# Patient Record
Sex: FEMALE | Race: WHITE | Employment: FULL TIME | URBAN - NONMETROPOLITAN AREA
[De-identification: names, ages, dates, MRNs, and addresses within clinical notes are randomized per-mention and may not be internally consistent; named-entity substitution may affect disease eponyms.]

---

## 2020-03-14 NOTE — PROGRESS NOTES
surgical history, allergies, medications, social and family history and I have made updates where appropriate. Review of Systems  Positive responses are highlighted in bold    Constitutional:  Fever, Chills, Night Sweats, Fatigue, Unexpected changes in weight  Eyes:  Eye discharge, Eye pain, Eye redness, Visual disturbances   HENT:  Ear pain, Tinnitus, Nosebleeds, Trouble swallowing, Hearing loss, Sore throat  Cardiovascular:  Chest Pain, Palpitations, Orthopnea, Paroxysmal Nocturnal Dyspnea  Respiratory:  Cough, Wheezing, Shortness of breath, Chest tightness, Apnea  Gastrointestinal:  Nausea, Vomiting, Diarrhea, Constipation, Heartburn, Blood in stool  Genitourinary:  Difficulty or painful urination, Flank pain, Change in frequency, Urgency  Skin:  Color change, Rash, Itching, Wound  Psychiatric:  Hallucinations, Anxiety, Depression, Suicidal ideation  Hematological:  Enlarged glands, Easy bleeding, Easily bruising  Musculoskeletal:  Joint pain, Back pain, Gait problems, Joint swelling, Myalgias  Neurological:  Dizziness, Headaches, Presyncope, Numbness, Seizures, Tremors  Allergy:  Environmental allergies, Food allergies  Endocrine:  Heat Intolerance, Cold Intolerance, Polydipsia, Polyphagia, Polyuria      PHYSICAL EXAM:  Vitals:    03/18/20 1330   BP: 127/63   Pulse: 74   Resp: 18   Temp: 98.7 °F (37.1 °C)   Weight: 101 lb 12.8 oz (46.2 kg)   Height: 5' 4\" (1.626 m)     Body mass index is 17.47 kg/m².   Pain: 2 (back/joints)    VS Reviewed  General Appearance: well developed and well- nourished, in no acute distress  Head: normocephalic and atraumatic  Eyes: pupils equal, round, and reactive to light, conjunctivae and eye lids without erythema  ENT: external ear and ear canal normal bilaterally, nose without deformity, nasal mucosa and turbinates normal without polyps, oropharynx normal, dentition is normal for age, no lip or gum lesions noted  Neck: supple and non-tender without mass, no thyromegaly or 0-800 /cmm        Abs Eos Count                   0                         0-500 /cmm        Abs Baso Count                  0                         0-200 /cmm      RBC Morphology        Anisocytosis                    2+        Exam Date: 03/06/20  Accession #:  [id number]  Exam:  CT   CT Head Without Contrast [id number]  Result: See Report    **     ADDENDUM:    **  STUDY:  CT BRAIN WITHOUT CONTRAST   REASON FOR EXAM:   Female, 77years old. Altered mental status. History   of advanced lung cancer. TECHNIQUE:   Transaxial CT imaging of the brain was performed without   administration of intravenous contrast material.   Individualized dose optimization techniques were used for this CT.   COMPARISON:   2/26/2015, and MRI 11/14/2019.   ___________________________________   FINDINGS:     Normal soft tissue structures. Normal calvarium. Since previous exam including the recent MRI, patient has developed   several intracranial masses consistent with metastatic disease. There is a mildly hyperdense mass with probable central necrosis of the   right temporal lobe, measuring 2.3 cm greatest dimension and associated   with prominent surrounding vasogenic edema. There is effacement of   overlying sulci. There are bilateral relatively symmetric masses of the occipital lobes,   3.7 cm greatest dimension on the right, and 3.6 cm greatest dimension on   the left. Both have probable central necrosis. Both have prominent   surrounding vasogenic edema. Both have effacement of overlying sulci. There is otherwise mild atrophy and diffuse white matter disease. No   midline shift or evidence for transtentorial herniation. Normal basal ganglia and thalami. Normal brainstem. Normal cerebellum. There is no intracranial hemorrhage. There are no findings of an acute   ischemic infarction. Normal visualized paranasal sinuses.    ___________________________________     IMPRESSION:   At least 3 moderately large probable metastases since previous exams. STUDY:  CT BRAIN WITHOUT CONTRAST   REASON FOR EXAM:   Female, 77years old. Altered mental status. History   of advanced lung cancer. TECHNIQUE:   Transaxial CT imaging of the brain was performed without   administration of intravenous contrast material.   Individualized dose optimization techniques were used for this CT.   COMPARISON:   2/26/2015, and MRI 11/14/2019.   ___________________________________   FINDINGS:     Normal soft tissue structures. Normal calvarium. Since previous exam including the recent MRI, patient has developed   several intracranial masses consistent with metastatic disease. There is a mildly hyperdense mass with probable central necrosis of the   right temporal lobe, measuring 2.3 cm greatest dimension and associated   with prominent surrounding vasogenic edema. There is effacement of   overlying sulci. There are bilateral relatively symmetric masses of the occipital lobes,   3.7 cm greatest dimension on the right, and 3.6 cm greatest dimension on   the left. Both have probable central necrosis. Both have prominent   surrounding vasogenic edema. Both have effacement of overlying sulci. There is otherwise mild atrophy and diffuse white matter disease. No   midline shift or evidence for transtentorial herniation. Normal basal ganglia and thalami. Normal brainstem. Normal cerebellum. There is no intracranial hemorrhage. There are no findings of an acute   ischemic infarction. Normal visualized paranasal sinuses. ___________________________________     IMPRESSION:   At least 3 moderately large probable metastases since previous exams. N.B. : The above information has been verbally conveyed by Ria Vizcarra MD to  FAISAL Arnold, on 03/06/2020 16:15:06 (ET).      Electronically Signed:   Kendra Valenzuela MD   2020/03/06 at 16:07 EST   Tel [phone], Service support [phone], Fax [phone]          Addendum Dictated by:  Dharmesh Rooney MD on 03/06/20 at [phone]  Transcribed by:  Dharmesh Rooney on 03/06/20 at [phone]    Report Signed by:  Dharmesh Rooney MD on 03/06/20 5221      **We are attempting to reach an attending provider to discuss findings. An   addendum with communication details will be sent when the communication is   complete. **     STUDY:  CT BRAIN WITHOUT CONTRAST   REASON FOR EXAM:   Female, 77years old. Altered mental status. History   of advanced lung cancer. TECHNIQUE:   Transaxial CT imaging of the brain was performed without   administration of intravenous contrast material.   Individualized dose optimization techniques were used for this CT.   COMPARISON:   2/26/2015, and MRI 11/14/2019.   ___________________________________   FINDINGS:     Normal soft tissue structures. Normal calvarium. Since previous exam including the recent MRI, patient has developed   several intracranial masses consistent with metastatic disease. There is a mildly hyperdense mass with probable central necrosis of the   right temporal lobe, measuring 2.3 cm greatest dimension and associated   with prominent surrounding vasogenic edema. There is effacement of   overlying sulci. There are bilateral relatively symmetric masses of the occipital lobes,   3.7 cm greatest dimension on the right, and 3.6 cm greatest dimension on   the left. Both have probable central necrosis. Both have prominent   surrounding vasogenic edema. Both have effacement of overlying sulci. There is otherwise mild atrophy and diffuse white matter disease. No   midline shift or evidence for transtentorial herniation. Normal basal ganglia and thalami. Normal brainstem. Normal cerebellum. There is no intracranial hemorrhage. There are no findings of an acute   ischemic infarction. Normal visualized paranasal sinuses.    ___________________________________ IMPRESSION:   At least 3 moderately large probable metastases since previous exams. Electronically Signed:   Mor Callejas MD   2020/03/06 at 16:07 EST   Tel [phone], Service support  [phone], Fax [phone]      ASSESSMENT & PLAN  1. Non-small cell lung cancer, unspecified laterality [facility]    Stage IV with mets to the brain  Currently full code  I don't think she fully understands what's going on, but it's hard to say  Will con't with onc plan for chemo/radiation  con't decadron her oncology    Pt adamant about going home  However, family feel she's not safe or ready. I and therapy agree  She will stay here until safe. She could leave AMA, but her family will not allow that. 2. Lung cancer metastatic to brain [facility])    As per # 1    3. Acute encephalopathy    Does appear to be improving  con't decadron and oncology f/u    4. Normocytic anemia    Stable  Monitor labs  Likely d/t cancer, chemo etc    5. Hyponatremia    Better on f/u labs  Monitor. 6. Chronic obstructive pulmonary disease, unspecified COPD type ([facility]    Stable  con't O2, prn alb    7. Paroxysmal atrial fibrillation ([facility])    Stable  con't eliquis, lopressor  lasix  Monitor     8. Hypertension, essential    At goal  con't norvasc, lopressor    9. History of CVA (cerebrovascular accident)    con't tertiary prevention, eliquis    10. Physical deconditioning    con't PT/OT      Disposition: FULL CODE. Con't PT/OT. Reassess 30 days, sooner prn.        Electronically signed by Yumi Mayo DO on 3/18/2020 at 1:48 PM

## 2020-03-18 ENCOUNTER — OUTSIDE SERVICES (OUTPATIENT)
Dept: FAMILY MEDICINE CLINIC | Age: 65
End: 2020-03-18
Payer: COMMERCIAL

## 2020-03-18 VITALS
TEMPERATURE: 98.7 F | HEART RATE: 74 BPM | DIASTOLIC BLOOD PRESSURE: 63 MMHG | WEIGHT: 101.8 LBS | RESPIRATION RATE: 18 BRPM | SYSTOLIC BLOOD PRESSURE: 127 MMHG | BODY MASS INDEX: 17.38 KG/M2 | HEIGHT: 64 IN

## 2020-03-18 PROCEDURE — 99305 1ST NF CARE MODERATE MDM 35: CPT | Performed by: FAMILY MEDICINE

## 2020-04-03 ENCOUNTER — VIRTUAL VISIT (OUTPATIENT)
Dept: FAMILY MEDICINE CLINIC | Age: 65
End: 2020-04-03
Payer: COMMERCIAL

## 2020-04-03 VITALS
TEMPERATURE: 98.4 F | SYSTOLIC BLOOD PRESSURE: 106 MMHG | WEIGHT: 102.2 LBS | HEIGHT: 64 IN | DIASTOLIC BLOOD PRESSURE: 64 MMHG | RESPIRATION RATE: 16 BRPM | HEART RATE: 82 BPM | BODY MASS INDEX: 17.45 KG/M2

## 2020-04-03 PROCEDURE — 99310 SBSQ NF CARE HIGH MDM 45: CPT | Performed by: FAMILY MEDICINE

## 2020-04-03 NOTE — PROGRESS NOTES
K92.2 - Gastrointestinal hemorrhage, unspecified  Home Going Treatment Plan:  Status post 2 PRBC transfusion. -H&H is stable. Patient is vehemently refusing EGD/colonoscopy & procedure has been  canceled. -Continue to hold Eliquis and patient is seen by her oncologist outpatient. H&H remained  stable. -Continue PT/OT and pending disposition, follow-up with /case management. (2) Atrial fibrillation  Status: Acute  Qualifiers:     Qualified Code(s): I48.91 - Unspecified atrial fibrillation  Home Going Treatment Plan:  continue to hold Eliquis for now. Continue beta-blocker. (3) Lung cancer metastatic to brain  Status: Acute  Home Going Treatment Plan:  Updated patient's oncologist Dr. Jake Miller. Follow-up oncology outpatient. (4) Malnourished  Status: Acute  Qualifiers:     Malnutrition type: protein-calorie malnutrition   Protein-calorie malnutrition  severity: moderate   Qualified Code(s): E44.0 - Moderate protein-calorie malnutrition  Home Going Treatment Plan:  Nutritional support. [facility] Course  Patient received 2 PRBC transfusion. Eliquis was placed on hold. Patient was evaluated  by GI and recommended endoscopy. Patient vehemently refused endoscopic testing. H&H  improved & remained stable. Eliquis will continue to be placed for on hold due to  bleeding risk until patient is seen by hematology/oncologist to decide when to resume. Patient remained stable and will be discharged to SNF. Kristie Leblanc \"    Since readmission:  Doing well  No s/s GI bleeding  Denies abd pain  No melena or hematochezia  Labs stable today. Was placed on seroquel during admission, it's not immediately clear why this was done  Pt denies any agitation  Does have some anxiety  No sI/hI      -LAST VISIT:  Patient admitted to [facility] from 3/6 to 3/13 for AMS. Known stage IV NSCLC with brain metastases.    D/c summary:  [facility] Course  78 yo Patient with pmh non small cell lung CA, COPD currently smoking 2 packs a pain, Tinnitus, Nosebleeds, Trouble swallowing, Hearing loss, Sore throat  Cardiovascular:  Chest Pain, Palpitations, Orthopnea, Paroxysmal Nocturnal Dyspnea  Respiratory:  Cough, Wheezing, Shortness of breath, Chest tightness, Apnea  Gastrointestinal:  Nausea, Vomiting, Diarrhea, Constipation, Heartburn, Blood in stool  Genitourinary:  Difficulty or painful urination, Flank pain, Change in frequency, Urgency  Skin:  Color change, Rash, Itching, Wound  Psychiatric:  Hallucinations, Anxiety, Depression, Suicidal ideation  Hematological:  Enlarged glands, Easy bleeding, Easily bruising  Musculoskeletal:  Joint pain, Back pain, Gait problems, Joint swelling, Myalgias  Neurological:  Dizziness, Headaches, Presyncope, Numbness, Seizures, Tremors  Allergy:  Environmental allergies, Food allergies  Endocrine:  Heat Intolerance, Cold Intolerance, Polydipsia, Polyphagia, Polyuria      PHYSICAL EXAM:    Vitals:    04/03/20 1615   BP: 106/64   Pulse: 82   Resp: 16   Temp: 98.4 °F (36.9 °C)   Weight: 102 lb 3.2 oz (46.4 kg)   Height: 5' 4\" (1.626 m)     Pain Score: 3(Chronic back pain)  Body mass index is 17.54 kg/m². VS Reviewed  General Appearance: A&O x 3, No acute distress,well developed and well- nourished  Head: normocephalic and atraumatic  Eyes: pupils equal, round, and reactive to light, extraocular eye movements intact, conjunctivae and eye lids without erythema  ENT: External ears w/o redness, external nares without redness or discharge. Hearing is intact. Lips are w/o lesion. Teeth are in good repair. Neck: No deformity of the neck. No thyromegaly on visual inspection. Pulmonary/Chest: normal respiratory effort. Normal respiratory rate. No respiratory distress . Musculoskeletal: Gait: unsteady. Digits/Nails: with arthritic changes.  No obvious clubbing   Neuro Exam:   Cranial nerve III: Pupils: equal, round, reactive to light   Cranial nerves III, IV, VI: Extraocular Movements: intact   Cranial nerve

## 2020-04-12 ENCOUNTER — HOSPITAL ENCOUNTER (INPATIENT)
Age: 65
LOS: 3 days | Discharge: NURSING, REHAB OR LONG-TERM CARE | DRG: 871 | End: 2020-04-15
Attending: INTERNAL MEDICINE | Admitting: INTERNAL MEDICINE
Payer: COMMERCIAL

## 2020-04-12 ENCOUNTER — APPOINTMENT (OUTPATIENT)
Dept: CT IMAGING | Age: 65
DRG: 871 | End: 2020-04-12
Payer: COMMERCIAL

## 2020-04-12 PROBLEM — A41.9 SEPSIS (HCC): Status: ACTIVE | Noted: 2020-04-12

## 2020-04-12 LAB
ALBUMIN SERPL-MCNC: 2.6 G/DL (ref 3.5–5.1)
ALP BLD-CCNC: 122 U/L (ref 38–126)
ALT SERPL-CCNC: 20 U/L (ref 11–66)
ANION GAP SERPL CALCULATED.3IONS-SCNC: 14 MEQ/L (ref 8–16)
ANISOCYTOSIS: PRESENT
AST SERPL-CCNC: 10 U/L (ref 5–40)
BASOPHILIC STIPPLING: ABNORMAL
BASOPHILS # BLD: 0 %
BASOPHILS ABSOLUTE: 0 THOU/MM3 (ref 0–0.1)
BILIRUB SERPL-MCNC: < 0.2 MG/DL (ref 0.3–1.2)
BILIRUBIN DIRECT: < 0.2 MG/DL (ref 0–0.3)
BILIRUBIN URINE: NEGATIVE
BLOOD, URINE: NEGATIVE
BUN BLDV-MCNC: 46 MG/DL (ref 7–22)
C-REACTIVE PROTEIN: 3.28 MG/DL (ref 0–1)
CALCIUM SERPL-MCNC: 8.4 MG/DL (ref 8.5–10.5)
CHARACTER, URINE: CLEAR
CHLORIDE BLD-SCNC: 92 MEQ/L (ref 98–111)
CO2: 30 MEQ/L (ref 23–33)
COLOR: YELLOW
CREAT SERPL-MCNC: 1.2 MG/DL (ref 0.4–1.2)
DIFFERENTIAL, MANUAL: NORMAL
EKG ATRIAL RATE: 78 BPM
EKG P AXIS: 67 DEGREES
EKG P-R INTERVAL: 146 MS
EKG Q-T INTERVAL: 398 MS
EKG QRS DURATION: 88 MS
EKG QTC CALCULATION (BAZETT): 453 MS
EKG R AXIS: 75 DEGREES
EKG T AXIS: 107 DEGREES
EKG VENTRICULAR RATE: 78 BPM
EOSINOPHIL # BLD: 0 %
EOSINOPHILS ABSOLUTE: 0 THOU/MM3 (ref 0–0.4)
ERYTHROCYTE [DISTWIDTH] IN BLOOD BY AUTOMATED COUNT: 19 % (ref 11.5–14.5)
ERYTHROCYTE [DISTWIDTH] IN BLOOD BY AUTOMATED COUNT: 65.9 FL (ref 35–45)
FERRITIN: 332 NG/ML (ref 10–291)
FLU A ANTIGEN: NEGATIVE
FLU B ANTIGEN: NEGATIVE
GFR SERPL CREATININE-BSD FRML MDRD: 45 ML/MIN/1.73M2
GLUCOSE BLD-MCNC: 159 MG/DL (ref 70–108)
GLUCOSE URINE: NEGATIVE MG/DL
GROUP A STREP CULTURE, REFLEX: NEGATIVE
HCT VFR BLD CALC: 26.6 % (ref 37–47)
HEMOGLOBIN: 8.6 GM/DL (ref 12–16)
INR BLD: 0.91 (ref 0.85–1.13)
KETONES, URINE: NEGATIVE
LACTIC ACID, SEPSIS: 2.2 MMOL/L (ref 0.5–1.9)
LACTIC ACID, SEPSIS: 4.3 MMOL/L (ref 0.5–1.9)
LD: 202 U/L (ref 100–190)
LEUKOCYTE ESTERASE, URINE: NEGATIVE
LIPASE: 16.5 U/L (ref 5.6–51.3)
LYMPHOCYTES # BLD: 1 %
LYMPHOCYTES ABSOLUTE: 0.2 THOU/MM3 (ref 1–4.8)
MAGNESIUM: 1.8 MG/DL (ref 1.6–2.4)
MCH RBC QN AUTO: 30.6 PG (ref 26–33)
MCHC RBC AUTO-ENTMCNC: 32.3 GM/DL (ref 32.2–35.5)
MCV RBC AUTO: 94.7 FL (ref 81–99)
METAMYELOCYTES: 1 %
MONOCYTES # BLD: 1 %
MONOCYTES ABSOLUTE: 0.2 THOU/MM3 (ref 0.4–1.3)
NITRITE, URINE: NEGATIVE
NUCLEATED RED BLOOD CELLS: 0 /100 WBC
OSMOLALITY CALCULATION: 287.2 MOSMOL/KG (ref 275–300)
PH UA: 7 (ref 5–9)
PLATELET # BLD: 182 THOU/MM3 (ref 130–400)
PLATELET ESTIMATE: ADEQUATE
PMV BLD AUTO: 8.7 FL (ref 9.4–12.4)
POTASSIUM SERPL-SCNC: 4.1 MEQ/L (ref 3.5–5.2)
PROCALCITONIN: 0.46 NG/ML (ref 0.01–0.09)
PROTEIN UA: NEGATIVE
RBC # BLD: 2.81 MILL/MM3 (ref 4.2–5.4)
REFLEX THROAT C + S: NORMAL
SEG NEUTROPHILS: 97 %
SEGMENTED NEUTROPHILS ABSOLUTE COUNT: 21.6 THOU/MM3 (ref 1.8–7.7)
SODIUM BLD-SCNC: 136 MEQ/L (ref 135–145)
SPECIFIC GRAVITY, URINE: 1.02 (ref 1–1.03)
TOTAL PROTEIN: 5.2 G/DL (ref 6.1–8)
TOXIC GRANULATION: PRESENT
TROPONIN T: 0.02 NG/ML
UROBILINOGEN, URINE: 0.2 EU/DL (ref 0–1)
WBC # BLD: 22.3 THOU/MM3 (ref 4.8–10.8)

## 2020-04-12 PROCEDURE — 2580000003 HC RX 258: Performed by: PHYSICIAN ASSISTANT

## 2020-04-12 PROCEDURE — 86140 C-REACTIVE PROTEIN: CPT

## 2020-04-12 PROCEDURE — 87070 CULTURE OTHR SPECIMN AEROBIC: CPT

## 2020-04-12 PROCEDURE — 6370000000 HC RX 637 (ALT 250 FOR IP): Performed by: PHYSICIAN ASSISTANT

## 2020-04-12 PROCEDURE — 2580000003 HC RX 258: Performed by: NURSE PRACTITIONER

## 2020-04-12 PROCEDURE — 85610 PROTHROMBIN TIME: CPT

## 2020-04-12 PROCEDURE — 6360000004 HC RX CONTRAST MEDICATION: Performed by: PHYSICIAN ASSISTANT

## 2020-04-12 PROCEDURE — 96374 THER/PROPH/DIAG INJ IV PUSH: CPT

## 2020-04-12 PROCEDURE — 93005 ELECTROCARDIOGRAM TRACING: CPT | Performed by: PHYSICIAN ASSISTANT

## 2020-04-12 PROCEDURE — 85025 COMPLETE CBC W/AUTO DIFF WBC: CPT

## 2020-04-12 PROCEDURE — 84145 PROCALCITONIN (PCT): CPT

## 2020-04-12 PROCEDURE — 87804 INFLUENZA ASSAY W/OPTIC: CPT

## 2020-04-12 PROCEDURE — 87147 CULTURE TYPE IMMUNOLOGIC: CPT

## 2020-04-12 PROCEDURE — 6370000000 HC RX 637 (ALT 250 FOR IP): Performed by: NURSE PRACTITIONER

## 2020-04-12 PROCEDURE — 2060000000 HC ICU INTERMEDIATE R&B

## 2020-04-12 PROCEDURE — 71275 CT ANGIOGRAPHY CHEST: CPT

## 2020-04-12 PROCEDURE — 82728 ASSAY OF FERRITIN: CPT

## 2020-04-12 PROCEDURE — 99285 EMERGENCY DEPT VISIT HI MDM: CPT

## 2020-04-12 PROCEDURE — 82248 BILIRUBIN DIRECT: CPT

## 2020-04-12 PROCEDURE — U0002 COVID-19 LAB TEST NON-CDC: HCPCS

## 2020-04-12 PROCEDURE — 99223 1ST HOSP IP/OBS HIGH 75: CPT | Performed by: INTERNAL MEDICINE

## 2020-04-12 PROCEDURE — 87880 STREP A ASSAY W/OPTIC: CPT

## 2020-04-12 PROCEDURE — 83615 LACTATE (LD) (LDH) ENZYME: CPT

## 2020-04-12 PROCEDURE — 6360000002 HC RX W HCPCS: Performed by: PHYSICIAN ASSISTANT

## 2020-04-12 PROCEDURE — 81003 URINALYSIS AUTO W/O SCOPE: CPT

## 2020-04-12 PROCEDURE — 83690 ASSAY OF LIPASE: CPT

## 2020-04-12 PROCEDURE — 83605 ASSAY OF LACTIC ACID: CPT

## 2020-04-12 PROCEDURE — 80053 COMPREHEN METABOLIC PANEL: CPT

## 2020-04-12 PROCEDURE — 87040 BLOOD CULTURE FOR BACTERIA: CPT

## 2020-04-12 PROCEDURE — 83735 ASSAY OF MAGNESIUM: CPT

## 2020-04-12 PROCEDURE — 96361 HYDRATE IV INFUSION ADD-ON: CPT

## 2020-04-12 PROCEDURE — 36415 COLL VENOUS BLD VENIPUNCTURE: CPT

## 2020-04-12 PROCEDURE — 84484 ASSAY OF TROPONIN QUANT: CPT

## 2020-04-12 PROCEDURE — 2709999900 HC NON-CHARGEABLE SUPPLY

## 2020-04-12 PROCEDURE — 87081 CULTURE SCREEN ONLY: CPT

## 2020-04-12 RX ORDER — ONDANSETRON 2 MG/ML
4 INJECTION INTRAMUSCULAR; INTRAVENOUS ONCE
Status: COMPLETED | OUTPATIENT
Start: 2020-04-12 | End: 2020-04-12

## 2020-04-12 RX ORDER — LEVETIRACETAM 500 MG/1
500 TABLET ORAL 2 TIMES DAILY
COMMUNITY

## 2020-04-12 RX ORDER — LANOLIN ALCOHOL/MO/W.PET/CERES
3 CREAM (GRAM) TOPICAL DAILY
COMMUNITY

## 2020-04-12 RX ORDER — LANOLIN ALCOHOL/MO/W.PET/CERES
3 CREAM (GRAM) TOPICAL DAILY
Status: DISCONTINUED | OUTPATIENT
Start: 2020-04-13 | End: 2020-04-15 | Stop reason: HOSPADM

## 2020-04-12 RX ORDER — 0.9 % SODIUM CHLORIDE 0.9 %
30 INTRAVENOUS SOLUTION INTRAVENOUS ONCE
Status: COMPLETED | OUTPATIENT
Start: 2020-04-12 | End: 2020-04-12

## 2020-04-12 RX ORDER — SODIUM CHLORIDE 0.9 % (FLUSH) 0.9 %
10 SYRINGE (ML) INJECTION EVERY 12 HOURS SCHEDULED
Status: DISCONTINUED | OUTPATIENT
Start: 2020-04-12 | End: 2020-04-15 | Stop reason: HOSPADM

## 2020-04-12 RX ORDER — QUETIAPINE FUMARATE 25 MG/1
25 TABLET, FILM COATED ORAL 2 TIMES DAILY
Status: DISCONTINUED | OUTPATIENT
Start: 2020-04-13 | End: 2020-04-15 | Stop reason: HOSPADM

## 2020-04-12 RX ORDER — ACETAMINOPHEN 325 MG/1
650 TABLET ORAL EVERY 6 HOURS PRN
COMMUNITY

## 2020-04-12 RX ORDER — POLYETHYLENE GLYCOL 3350 17 G/17G
17 POWDER, FOR SOLUTION ORAL DAILY PRN
Status: DISCONTINUED | OUTPATIENT
Start: 2020-04-12 | End: 2020-04-15 | Stop reason: HOSPADM

## 2020-04-12 RX ORDER — SODIUM CHLORIDE 0.9 % (FLUSH) 0.9 %
10 SYRINGE (ML) INJECTION PRN
Status: DISCONTINUED | OUTPATIENT
Start: 2020-04-12 | End: 2020-04-15 | Stop reason: HOSPADM

## 2020-04-12 RX ORDER — QUETIAPINE FUMARATE 25 MG/1
25 TABLET, FILM COATED ORAL 2 TIMES DAILY
COMMUNITY

## 2020-04-12 RX ORDER — METOPROLOL SUCCINATE 50 MG/1
50 TABLET, EXTENDED RELEASE ORAL DAILY
Status: DISCONTINUED | OUTPATIENT
Start: 2020-04-13 | End: 2020-04-14

## 2020-04-12 RX ORDER — QUETIAPINE FUMARATE 50 MG/1
50 TABLET, EXTENDED RELEASE ORAL NIGHTLY
Status: DISCONTINUED | OUTPATIENT
Start: 2020-04-12 | End: 2020-04-15 | Stop reason: HOSPADM

## 2020-04-12 RX ORDER — ONDANSETRON HYDROCHLORIDE 8 MG/1
8 TABLET, FILM COATED ORAL EVERY 8 HOURS PRN
COMMUNITY

## 2020-04-12 RX ORDER — AMLODIPINE BESYLATE 10 MG/1
10 TABLET ORAL DAILY
COMMUNITY

## 2020-04-12 RX ORDER — ACETAMINOPHEN 325 MG/1
650 TABLET ORAL ONCE
Status: COMPLETED | OUTPATIENT
Start: 2020-04-12 | End: 2020-04-12

## 2020-04-12 RX ORDER — BUDESONIDE AND FORMOTEROL FUMARATE DIHYDRATE 160; 4.5 UG/1; UG/1
2 AEROSOL RESPIRATORY (INHALATION) 2 TIMES DAILY
Status: DISCONTINUED | OUTPATIENT
Start: 2020-04-12 | End: 2020-04-15 | Stop reason: HOSPADM

## 2020-04-12 RX ORDER — METOPROLOL SUCCINATE 50 MG/1
50 TABLET, EXTENDED RELEASE ORAL DAILY
Status: ON HOLD | COMMUNITY
End: 2020-04-15 | Stop reason: HOSPADM

## 2020-04-12 RX ORDER — QUETIAPINE FUMARATE 50 MG/1
50 TABLET, EXTENDED RELEASE ORAL NIGHTLY
COMMUNITY

## 2020-04-12 RX ORDER — ACETAMINOPHEN 650 MG/1
650 SUPPOSITORY RECTAL EVERY 6 HOURS PRN
Status: DISCONTINUED | OUTPATIENT
Start: 2020-04-12 | End: 2020-04-13

## 2020-04-12 RX ORDER — PANTOPRAZOLE SODIUM 20 MG/1
20 TABLET, DELAYED RELEASE ORAL DAILY
COMMUNITY

## 2020-04-12 RX ORDER — PANTOPRAZOLE SODIUM 40 MG/1
40 TABLET, DELAYED RELEASE ORAL DAILY
Status: DISCONTINUED | OUTPATIENT
Start: 2020-04-13 | End: 2020-04-14

## 2020-04-12 RX ORDER — ONDANSETRON 4 MG/1
8 TABLET, FILM COATED ORAL EVERY 8 HOURS PRN
Status: DISCONTINUED | OUTPATIENT
Start: 2020-04-12 | End: 2020-04-15 | Stop reason: HOSPADM

## 2020-04-12 RX ORDER — BUDESONIDE AND FORMOTEROL FUMARATE DIHYDRATE 160; 4.5 UG/1; UG/1
2 AEROSOL RESPIRATORY (INHALATION) 2 TIMES DAILY
COMMUNITY

## 2020-04-12 RX ORDER — DEXAMETHASONE 4 MG/1
4 TABLET ORAL 2 TIMES DAILY WITH MEALS
Status: DISCONTINUED | OUTPATIENT
Start: 2020-04-13 | End: 2020-04-15 | Stop reason: HOSPADM

## 2020-04-12 RX ORDER — POLYETHYLENE GLYCOL 3350 17 G/17G
17 POWDER, FOR SOLUTION ORAL DAILY PRN
COMMUNITY

## 2020-04-12 RX ORDER — FUROSEMIDE 40 MG/1
40 TABLET ORAL 2 TIMES DAILY
Status: DISCONTINUED | OUTPATIENT
Start: 2020-04-13 | End: 2020-04-13

## 2020-04-12 RX ORDER — AMLODIPINE BESYLATE 10 MG/1
10 TABLET ORAL DAILY
Status: DISCONTINUED | OUTPATIENT
Start: 2020-04-13 | End: 2020-04-15 | Stop reason: HOSPADM

## 2020-04-12 RX ORDER — 0.9 % SODIUM CHLORIDE 0.9 %
500 INTRAVENOUS SOLUTION INTRAVENOUS ONCE
Status: COMPLETED | OUTPATIENT
Start: 2020-04-12 | End: 2020-04-12

## 2020-04-12 RX ORDER — DEXAMETHASONE 4 MG/1
4 TABLET ORAL 2 TIMES DAILY WITH MEALS
Status: ON HOLD | COMMUNITY
End: 2020-04-15 | Stop reason: HOSPADM

## 2020-04-12 RX ORDER — SENNOSIDES 8.8 MG/5ML
5 LIQUID ORAL 2 TIMES DAILY PRN
Status: DISCONTINUED | OUTPATIENT
Start: 2020-04-12 | End: 2020-04-15 | Stop reason: HOSPADM

## 2020-04-12 RX ORDER — ACETAMINOPHEN 325 MG/1
650 TABLET ORAL EVERY 6 HOURS PRN
Status: DISCONTINUED | OUTPATIENT
Start: 2020-04-12 | End: 2020-04-13

## 2020-04-12 RX ORDER — PROMETHAZINE HYDROCHLORIDE 25 MG/1
12.5 TABLET ORAL EVERY 6 HOURS PRN
Status: DISCONTINUED | OUTPATIENT
Start: 2020-04-12 | End: 2020-04-15 | Stop reason: HOSPADM

## 2020-04-12 RX ORDER — PROCHLORPERAZINE MALEATE 10 MG
10 TABLET ORAL EVERY 6 HOURS PRN
Status: DISCONTINUED | OUTPATIENT
Start: 2020-04-12 | End: 2020-04-15 | Stop reason: HOSPADM

## 2020-04-12 RX ORDER — FUROSEMIDE 40 MG/1
40 TABLET ORAL 2 TIMES DAILY
Status: ON HOLD | COMMUNITY
End: 2020-04-15 | Stop reason: HOSPADM

## 2020-04-12 RX ORDER — 0.9 % SODIUM CHLORIDE 0.9 %
500 INTRAVENOUS SOLUTION INTRAVENOUS ONCE
Status: DISCONTINUED | OUTPATIENT
Start: 2020-04-12 | End: 2020-04-12

## 2020-04-12 RX ORDER — LEVETIRACETAM 500 MG/1
500 TABLET ORAL 2 TIMES DAILY
Status: DISCONTINUED | OUTPATIENT
Start: 2020-04-12 | End: 2020-04-15 | Stop reason: HOSPADM

## 2020-04-12 RX ORDER — PROCHLORPERAZINE MALEATE 10 MG
10 TABLET ORAL EVERY 6 HOURS PRN
COMMUNITY

## 2020-04-12 RX ORDER — ONDANSETRON 2 MG/ML
4 INJECTION INTRAMUSCULAR; INTRAVENOUS EVERY 6 HOURS PRN
Status: DISCONTINUED | OUTPATIENT
Start: 2020-04-12 | End: 2020-04-15 | Stop reason: HOSPADM

## 2020-04-12 RX ADMIN — PIPERACILLIN AND TAZOBACTAM 3.38 G: 3; .375 INJECTION, POWDER, FOR SOLUTION INTRAVENOUS at 19:53

## 2020-04-12 RX ADMIN — ONDANSETRON 4 MG: 2 INJECTION INTRAMUSCULAR; INTRAVENOUS at 18:28

## 2020-04-12 RX ADMIN — SODIUM CHLORIDE 500 ML: 9 INJECTION, SOLUTION INTRAVENOUS at 16:29

## 2020-04-12 RX ADMIN — ACETAMINOPHEN 650 MG: 325 TABLET ORAL at 16:13

## 2020-04-12 RX ADMIN — VANCOMYCIN HYDROCHLORIDE 750 MG: 1 INJECTION, POWDER, LYOPHILIZED, FOR SOLUTION INTRAVENOUS at 20:32

## 2020-04-12 RX ADMIN — SODIUM CHLORIDE 1470 ML: 9 INJECTION, SOLUTION INTRAVENOUS at 21:41

## 2020-04-12 RX ADMIN — LEVETIRACETAM 500 MG: 500 TABLET, FILM COATED ORAL at 23:49

## 2020-04-12 RX ADMIN — Medication 10 ML: at 23:52

## 2020-04-12 RX ADMIN — ACETAMINOPHEN 650 MG: 325 TABLET ORAL at 23:49

## 2020-04-12 RX ADMIN — IOPAMIDOL 80 ML: 755 INJECTION, SOLUTION INTRAVENOUS at 18:56

## 2020-04-12 RX ADMIN — QUETIAPINE FUMARATE 50 MG: 50 TABLET, EXTENDED RELEASE ORAL at 23:49

## 2020-04-12 ASSESSMENT — ENCOUNTER SYMPTOMS
COLOR CHANGE: 0
VOMITING: 1
DIARRHEA: 0
SORE THROAT: 0
NAUSEA: 1
COUGH: 0
SINUS PRESSURE: 0
SHORTNESS OF BREATH: 0
ABDOMINAL PAIN: 0
SINUS PAIN: 0
WHEEZING: 0
BACK PAIN: 0
RHINORRHEA: 0
CONSTIPATION: 0

## 2020-04-12 ASSESSMENT — PAIN SCALES - GENERAL
PAINLEVEL_OUTOF10: 4
PAINLEVEL_OUTOF10: 3

## 2020-04-12 ASSESSMENT — PAIN DESCRIPTION - PROGRESSION: CLINICAL_PROGRESSION: GRADUALLY WORSENING

## 2020-04-12 ASSESSMENT — PAIN DESCRIPTION - LOCATION
LOCATION: HEAD

## 2020-04-12 ASSESSMENT — PAIN DESCRIPTION - DIRECTION: RADIATING_TOWARDS: NO

## 2020-04-12 ASSESSMENT — PAIN DESCRIPTION - PAIN TYPE
TYPE: ACUTE PAIN

## 2020-04-12 ASSESSMENT — PAIN DESCRIPTION - ORIENTATION: ORIENTATION: ANTERIOR

## 2020-04-12 ASSESSMENT — PAIN DESCRIPTION - FREQUENCY: FREQUENCY: CONTINUOUS

## 2020-04-12 ASSESSMENT — PAIN DESCRIPTION - ONSET: ONSET: ON-GOING

## 2020-04-12 ASSESSMENT — PAIN DESCRIPTION - DESCRIPTORS: DESCRIPTORS: HEADACHE

## 2020-04-12 ASSESSMENT — PAIN - FUNCTIONAL ASSESSMENT: PAIN_FUNCTIONAL_ASSESSMENT: ACTIVITIES ARE NOT PREVENTED

## 2020-04-12 NOTE — ED NOTES
Bed: 038A  Expected date:   Expected time:   Means of arrival: [facility]  Comments:     Sharona Dunbar RN  04/12/20 9359

## 2020-04-12 NOTE — ED PROVIDER NOTES
her pulse is 74. Her respiration is 18 and oxygen saturation is 100%. Physical Exam  Vitals signs and nursing note reviewed. Constitutional:       General: She is not in acute distress. Appearance: Normal appearance. She is well-developed. She is not ill-appearing, toxic-appearing or diaphoretic. HENT:      Head: Normocephalic and atraumatic. Right Ear: External ear normal.      Left Ear: External ear normal.      Nose: Nose normal.      Mouth/Throat:      Mouth: Mucous membranes are moist.      Pharynx: Oropharynx is clear. Eyes:      General: No scleral icterus. Right eye: No discharge. Left eye: No discharge. Conjunctiva/sclera: Conjunctivae normal.      Pupils: Pupils are equal, round, and reactive to light. Neck:      Musculoskeletal: Normal range of motion. Cardiovascular:      Rate and Rhythm: Normal rate and regular rhythm. Heart sounds: Normal heart sounds. No murmur. No friction rub. No gallop. Pulmonary:      Effort: Pulmonary effort is normal. No respiratory distress. Breath sounds: Normal breath sounds. No stridor. No wheezing, rhonchi or rales. Chest:      Chest wall: No tenderness. Abdominal:      General: There is no distension. Palpations: Abdomen is soft. Musculoskeletal: Normal range of motion. Skin:     General: Skin is warm and dry. Coloration: Skin is not pale. Findings: No erythema or rash. Neurological:      Mental Status: She is alert and oriented to person, place, and time. Mental status is at baseline. GCS: GCS eye subscore is 4. GCS verbal subscore is 5. GCS motor subscore is 6. Motor: No abnormal muscle tone. Coordination: Coordination normal.   Psychiatric:         Behavior: Behavior normal.         Thought Content:  Thought content normal.               DIFFERENTIAL DIAGNOSIS:   Includes but not limited to: viral URI, influenza, bronchitis, pneumonia, pulmonary mass    DIAGNOSTIC RESULTS

## 2020-04-12 NOTE — ED NOTES
Patient provided crackers and apple sauce at this time. Patient updated on pending CTA. Patient states she does not know were mediport was placed and if it is a power port or not. [facility] contacted, states patient normally is seen at [facility]. Medical records requested from [facility].      Minnie Jane RN  04/12/20 4110

## 2020-04-13 PROBLEM — E43 SEVERE MALNUTRITION (HCC): Status: ACTIVE | Noted: 2020-04-13

## 2020-04-13 LAB
ALBUMIN SERPL-MCNC: 2.3 G/DL (ref 3.5–5.1)
ALP BLD-CCNC: 106 U/L (ref 38–126)
ALT SERPL-CCNC: 16 U/L (ref 11–66)
ANION GAP SERPL CALCULATED.3IONS-SCNC: 13 MEQ/L (ref 8–16)
ANISOCYTOSIS: PRESENT
AST SERPL-CCNC: 9 U/L (ref 5–40)
BASOPHILS # BLD: 0.2 %
BASOPHILS ABSOLUTE: 0 THOU/MM3 (ref 0–0.1)
BILIRUB SERPL-MCNC: < 0.2 MG/DL (ref 0.3–1.2)
BILIRUBIN DIRECT: < 0.2 MG/DL (ref 0–0.3)
BUN BLDV-MCNC: 30 MG/DL (ref 7–22)
C-REACTIVE PROTEIN: 3.62 MG/DL (ref 0–1)
CALCIUM SERPL-MCNC: 7.7 MG/DL (ref 8.5–10.5)
CHLORIDE BLD-SCNC: 101 MEQ/L (ref 98–111)
CK MB: 1.7 NG/ML
CO2: 27 MEQ/L (ref 23–33)
CREAT SERPL-MCNC: 0.8 MG/DL (ref 0.4–1.2)
EOSINOPHIL # BLD: 0.2 %
EOSINOPHILS ABSOLUTE: 0 THOU/MM3 (ref 0–0.4)
ERYTHROCYTE [DISTWIDTH] IN BLOOD BY AUTOMATED COUNT: 19.1 % (ref 11.5–14.5)
ERYTHROCYTE [DISTWIDTH] IN BLOOD BY AUTOMATED COUNT: 67.6 FL (ref 35–45)
FERRITIN: 325 NG/ML (ref 10–291)
GFR SERPL CREATININE-BSD FRML MDRD: 72 ML/MIN/1.73M2
GLUCOSE BLD-MCNC: 79 MG/DL (ref 70–108)
HCT VFR BLD CALC: 23.4 % (ref 37–47)
HEMOGLOBIN: 7.5 GM/DL (ref 12–16)
IMMATURE GRANS (ABS): 1.09 THOU/MM3 (ref 0–0.07)
IMMATURE GRANULOCYTES: 6.7 %
INR BLD: 1.03 (ref 0.85–1.13)
LACTIC ACID, SEPSIS: 1.4 MMOL/L (ref 0.5–1.9)
LD: 205 U/L (ref 100–190)
LYMPHOCYTES # BLD: 3.2 %
LYMPHOCYTES ABSOLUTE: 0.5 THOU/MM3 (ref 1–4.8)
MAGNESIUM: 1.6 MG/DL (ref 1.6–2.4)
MCH RBC QN AUTO: 30.9 PG (ref 26–33)
MCHC RBC AUTO-ENTMCNC: 32.1 GM/DL (ref 32.2–35.5)
MCV RBC AUTO: 96.3 FL (ref 81–99)
MONOCYTES # BLD: 2 %
MONOCYTES ABSOLUTE: 0.3 THOU/MM3 (ref 0.4–1.3)
NUCLEATED RED BLOOD CELLS: 0 /100 WBC
PLATELET # BLD: 145 THOU/MM3 (ref 130–400)
PMV BLD AUTO: 8.5 FL (ref 9.4–12.4)
POTASSIUM SERPL-SCNC: 3.8 MEQ/L (ref 3.5–5.2)
POTASSIUM SERPL-SCNC: 4 MEQ/L (ref 3.5–5.2)
PROCALCITONIN: 0.39 NG/ML (ref 0.01–0.09)
RBC # BLD: 2.43 MILL/MM3 (ref 4.2–5.4)
SEG NEUTROPHILS: 87.7 %
SEGMENTED NEUTROPHILS ABSOLUTE COUNT: 14.4 THOU/MM3 (ref 1.8–7.7)
SODIUM BLD-SCNC: 141 MEQ/L (ref 135–145)
TOTAL CK: 10 U/L (ref 30–135)
TOTAL PROTEIN: 4.3 G/DL (ref 6.1–8)
WBC # BLD: 16.4 THOU/MM3 (ref 4.8–10.8)

## 2020-04-13 PROCEDURE — 6370000000 HC RX 637 (ALT 250 FOR IP): Performed by: NURSE PRACTITIONER

## 2020-04-13 PROCEDURE — 6360000002 HC RX W HCPCS: Performed by: PHYSICIAN ASSISTANT

## 2020-04-13 PROCEDURE — 84145 PROCALCITONIN (PCT): CPT

## 2020-04-13 PROCEDURE — 85610 PROTHROMBIN TIME: CPT

## 2020-04-13 PROCEDURE — 86140 C-REACTIVE PROTEIN: CPT

## 2020-04-13 PROCEDURE — 6360000002 HC RX W HCPCS: Performed by: NURSE PRACTITIONER

## 2020-04-13 PROCEDURE — 83615 LACTATE (LD) (LDH) ENZYME: CPT

## 2020-04-13 PROCEDURE — 93010 ELECTROCARDIOGRAM REPORT: CPT | Performed by: INTERNAL MEDICINE

## 2020-04-13 PROCEDURE — 82728 ASSAY OF FERRITIN: CPT

## 2020-04-13 PROCEDURE — 80053 COMPREHEN METABOLIC PANEL: CPT

## 2020-04-13 PROCEDURE — 83605 ASSAY OF LACTIC ACID: CPT

## 2020-04-13 PROCEDURE — 2580000003 HC RX 258: Performed by: NURSE PRACTITIONER

## 2020-04-13 PROCEDURE — 82553 CREATINE MB FRACTION: CPT

## 2020-04-13 PROCEDURE — 2060000000 HC ICU INTERMEDIATE R&B

## 2020-04-13 PROCEDURE — 84132 ASSAY OF SERUM POTASSIUM: CPT

## 2020-04-13 PROCEDURE — 83735 ASSAY OF MAGNESIUM: CPT

## 2020-04-13 PROCEDURE — 2580000003 HC RX 258: Performed by: PHYSICIAN ASSISTANT

## 2020-04-13 PROCEDURE — 82248 BILIRUBIN DIRECT: CPT

## 2020-04-13 PROCEDURE — 85025 COMPLETE CBC W/AUTO DIFF WBC: CPT

## 2020-04-13 PROCEDURE — 36415 COLL VENOUS BLD VENIPUNCTURE: CPT

## 2020-04-13 PROCEDURE — 99223 1ST HOSP IP/OBS HIGH 75: CPT | Performed by: NURSE PRACTITIONER

## 2020-04-13 PROCEDURE — 82550 ASSAY OF CK (CPK): CPT

## 2020-04-13 PROCEDURE — 6370000000 HC RX 637 (ALT 250 FOR IP): Performed by: PHYSICIAN ASSISTANT

## 2020-04-13 PROCEDURE — 99233 SBSQ HOSP IP/OBS HIGH 50: CPT | Performed by: INTERNAL MEDICINE

## 2020-04-13 PROCEDURE — 2709999900 HC NON-CHARGEABLE SUPPLY

## 2020-04-13 RX ORDER — SODIUM CHLORIDE 9 MG/ML
INJECTION, SOLUTION INTRAVENOUS CONTINUOUS
Status: DISCONTINUED | OUTPATIENT
Start: 2020-04-13 | End: 2020-04-14

## 2020-04-13 RX ORDER — MAGNESIUM SULFATE IN WATER 40 MG/ML
2 INJECTION, SOLUTION INTRAVENOUS ONCE
Status: COMPLETED | OUTPATIENT
Start: 2020-04-13 | End: 2020-04-13

## 2020-04-13 RX ORDER — FUROSEMIDE 40 MG/1
40 TABLET ORAL 2 TIMES DAILY
Status: DISCONTINUED | OUTPATIENT
Start: 2020-04-14 | End: 2020-04-13

## 2020-04-13 RX ORDER — POTASSIUM CHLORIDE 20 MEQ/1
40 TABLET, EXTENDED RELEASE ORAL PRN
Status: DISCONTINUED | OUTPATIENT
Start: 2020-04-13 | End: 2020-04-15 | Stop reason: HOSPADM

## 2020-04-13 RX ORDER — ACETAMINOPHEN 325 MG/1
650 TABLET ORAL EVERY 4 HOURS PRN
Status: DISCONTINUED | OUTPATIENT
Start: 2020-04-13 | End: 2020-04-15 | Stop reason: HOSPADM

## 2020-04-13 RX ORDER — ACETAMINOPHEN 650 MG/1
650 SUPPOSITORY RECTAL EVERY 4 HOURS PRN
Status: DISCONTINUED | OUTPATIENT
Start: 2020-04-13 | End: 2020-04-15 | Stop reason: HOSPADM

## 2020-04-13 RX ORDER — CYCLOBENZAPRINE HCL 10 MG
10 TABLET ORAL 3 TIMES DAILY PRN
Status: DISCONTINUED | OUTPATIENT
Start: 2020-04-13 | End: 2020-04-15 | Stop reason: HOSPADM

## 2020-04-13 RX ORDER — POTASSIUM CHLORIDE 7.45 MG/ML
10 INJECTION INTRAVENOUS PRN
Status: DISCONTINUED | OUTPATIENT
Start: 2020-04-13 | End: 2020-04-15 | Stop reason: HOSPADM

## 2020-04-13 RX ADMIN — LEVETIRACETAM 500 MG: 500 TABLET, FILM COATED ORAL at 09:56

## 2020-04-13 RX ADMIN — ACETAMINOPHEN 650 MG: 325 TABLET ORAL at 04:33

## 2020-04-13 RX ADMIN — PANTOPRAZOLE SODIUM 40 MG: 40 TABLET, DELAYED RELEASE ORAL at 09:55

## 2020-04-13 RX ADMIN — MAGNESIUM SULFATE HEPTAHYDRATE 2 G: 40 INJECTION, SOLUTION INTRAVENOUS at 20:21

## 2020-04-13 RX ADMIN — SERTRALINE 50 MG: 50 TABLET, FILM COATED ORAL at 09:56

## 2020-04-13 RX ADMIN — Medication 2 PUFF: at 09:56

## 2020-04-13 RX ADMIN — PIPERACILLIN AND TAZOBACTAM 3.38 G: 3; .375 INJECTION, POWDER, FOR SOLUTION INTRAVENOUS at 13:12

## 2020-04-13 RX ADMIN — CYCLOBENZAPRINE 10 MG: 10 TABLET, FILM COATED ORAL at 04:33

## 2020-04-13 RX ADMIN — Medication 10 ML: at 21:14

## 2020-04-13 RX ADMIN — PIPERACILLIN AND TAZOBACTAM 3.38 G: 3; .375 INJECTION, POWDER, FOR SOLUTION INTRAVENOUS at 04:09

## 2020-04-13 RX ADMIN — DEXAMETHASONE 4 MG: 4 TABLET ORAL at 09:56

## 2020-04-13 RX ADMIN — LEVETIRACETAM 500 MG: 500 TABLET, FILM COATED ORAL at 21:13

## 2020-04-13 RX ADMIN — Medication 2 PUFF: at 21:16

## 2020-04-13 RX ADMIN — APIXABAN 2.5 MG: 2.5 TABLET, FILM COATED ORAL at 21:13

## 2020-04-13 RX ADMIN — QUETIAPINE FUMARATE 25 MG: 25 TABLET ORAL at 09:56

## 2020-04-13 RX ADMIN — VANCOMYCIN HYDROCHLORIDE 750 MG: 1 INJECTION, POWDER, LYOPHILIZED, FOR SOLUTION INTRAVENOUS at 21:56

## 2020-04-13 RX ADMIN — SODIUM CHLORIDE: 9 INJECTION, SOLUTION INTRAVENOUS at 01:38

## 2020-04-13 RX ADMIN — Medication 3 MG: at 09:56

## 2020-04-13 RX ADMIN — APIXABAN 2.5 MG: 2.5 TABLET, FILM COATED ORAL at 09:56

## 2020-04-13 RX ADMIN — Medication 2 PUFF: at 01:38

## 2020-04-13 RX ADMIN — PIPERACILLIN AND TAZOBACTAM 3.38 G: 3; .375 INJECTION, POWDER, FOR SOLUTION INTRAVENOUS at 20:24

## 2020-04-13 RX ADMIN — AMLODIPINE BESYLATE 10 MG: 10 TABLET ORAL at 09:56

## 2020-04-13 RX ADMIN — METOPROLOL SUCCINATE 50 MG: 50 TABLET, EXTENDED RELEASE ORAL at 09:56

## 2020-04-13 RX ADMIN — SODIUM CHLORIDE: 9 INJECTION, SOLUTION INTRAVENOUS at 15:18

## 2020-04-13 RX ADMIN — QUETIAPINE FUMARATE 25 MG: 25 TABLET ORAL at 21:13

## 2020-04-13 RX ADMIN — QUETIAPINE FUMARATE 50 MG: 50 TABLET, EXTENDED RELEASE ORAL at 21:13

## 2020-04-13 RX ADMIN — DEXAMETHASONE 4 MG: 4 TABLET ORAL at 17:51

## 2020-04-13 ASSESSMENT — PAIN DESCRIPTION - ORIENTATION: ORIENTATION: ANTERIOR

## 2020-04-13 ASSESSMENT — PAIN DESCRIPTION - FREQUENCY: FREQUENCY: CONTINUOUS

## 2020-04-13 ASSESSMENT — PAIN SCALES - GENERAL
PAINLEVEL_OUTOF10: 3
PAINLEVEL_OUTOF10: 0
PAINLEVEL_OUTOF10: 3
PAINLEVEL_OUTOF10: 0
PAINLEVEL_OUTOF10: 0

## 2020-04-13 ASSESSMENT — PAIN DESCRIPTION - PAIN TYPE: TYPE: ACUTE PAIN

## 2020-04-13 ASSESSMENT — PAIN DESCRIPTION - ONSET: ONSET: ON-GOING

## 2020-04-13 ASSESSMENT — PAIN DESCRIPTION - PROGRESSION: CLINICAL_PROGRESSION: NOT CHANGED

## 2020-04-13 ASSESSMENT — PAIN DESCRIPTION - DIRECTION: RADIATING_TOWARDS: NO

## 2020-04-13 ASSESSMENT — PAIN DESCRIPTION - DESCRIPTORS: DESCRIPTORS: HEADACHE

## 2020-04-13 ASSESSMENT — PAIN DESCRIPTION - LOCATION: LOCATION: HEAD

## 2020-04-13 ASSESSMENT — PAIN - FUNCTIONAL ASSESSMENT: PAIN_FUNCTIONAL_ASSESSMENT: ACTIVITIES ARE NOT PREVENTED

## 2020-04-13 NOTE — ED NOTES
Pt transported to [facility] in stable condition.       Vedia Hash  04/12/20 2045       Vedia Hash  04/12/20 2045

## 2020-04-13 NOTE — PLAN OF CARE
Problem: Falls - Risk of:  Goal: Will remain free from falls  Description: Will remain free from falls  Outcome: Ongoing  Note: Patient remains free from falls this shift. Fall precautions in place with bed exit alarmed. Fall sign posted and fall armband in place. Nonskid footwear used. Educated patient to use call light when in need of staff assistance with transferring, ambulating, and other activities of daily living. Patient appropriately uses call light this shift. Goal: Absence of physical injury  Description: Absence of physical injury  Outcome: Ongoing  Note: Hourly rounding in place to anticipate patient needs, such as assistance with pain, toileting, or repositioning, in order to decrease risk for injuries such as falls. Problem: Neurological  Goal: Maximum potential motor/sensory/cognitive function  Outcome: Ongoing  Note: Patient alert and oriented x4. Numbness and tingling noted in bilateral upper and lower extremities. Problem: Respiratory  Goal: No pulmonary complications  Outcome: Ongoing  Note: Patient currently has lung cancer. Remains on 2 lpm nasal cannula chronically. Will monitor for advanced oxygen need and encourage coughing and deep breathing. COVID-19 rule out. Goal: O2 Sat > 90%  Outcome: Ongoing  Note: Patient's oxygen saturation maintained greater than 90% on 2 lpm nasal cannula this shift. Problem: Nutrition  Goal: Optimal nutrition therapy  Outcome: Ongoing  Note: Patient appears malnourished. Adequate PO intake encouraged. Patient also receiving IV fluids and bolus. Problem: Skin Integrity/Risk  Goal: No skin breakdown during hospitalization  Outcome: Ongoing  Note: Patient's skin remains intact with no new signs of impaired skin integrity noted. Patient turned and repositioned every 2 hours. Special mattress in place to decrease pressure on high risk areas.       Problem: Discharge Planning:  Goal: Discharged to appropriate level of care  Description: Discharged to appropriate level of care  Outcome: Ongoing  Note: Patient is from [facility] and would like to discharge there once medically stable. Problem: Pain:  Goal: Pain level will decrease  Description: Pain level will decrease  Outcome: Ongoing  Note: Patient reports pain as a 4 on a 0-10 scale this shift. Patient's stated pain goal is no pain. Pain is acute and located in the head described as headache. Non-pharmacological pain interventions include emotional support, rest, and repositioning. Pharmacological pain interventions on board per physician's order. Care plan reviewed with patient. Patient verbalized understanding of the plan of care and contributed to goal setting.

## 2020-04-13 NOTE — PLAN OF CARE
Problem: Falls - Risk of:  Goal: Will remain free from falls  Description: Will remain free from falls  Outcome: Ongoing  Note: Patient up with one assist with walker. Patient compliant with using call light to call for assistance. Problem: Falls - Risk of:  Goal: Absence of physical injury  Description: Absence of physical injury  Outcome: Ongoing  Note: Pt free of physical injury. Problem: Neurological  Goal: Maximum potential motor/sensory/cognitive function  Outcome: Ongoing  Note: Pt alert and oriented x 4. Problem: Respiratory  Goal: No pulmonary complications  Outcome: Ongoing  Note: Pt currently on room air with sats greater than 90%. Problem: Respiratory  Goal: O2 Sat > 90%  Outcome: Ongoing  Note: 02 sats greater than 90%. Problem: Nutrition  Goal: Optimal nutrition therapy  4/13/2020 1525 by Darshan Alvarado RN  Outcome: Ongoing  Note: Patient ate 25% of lunch. Problem: Skin Integrity/Risk  Goal: No skin breakdown during hospitalization  Outcome: Ongoing  Note: Abrasion to left lower leg. Redness to buttock. Problem: Discharge Planning:  Goal: Discharged to appropriate level of care  Description: Discharged to appropriate level of care  4/13/2020 1525 by Darshan Alvarado RN  Outcome: Ongoing  Note: Discharge planning in progress. Patient plans to go home at discharge. Palliative care to see. Problem: Discharge Planning:  Goal: Ability to perform activities of daily living will improve  Description: Ability to perform activities of daily living will improve  Outcome: Ongoing  Note: Patient needs moderate assist with ADL's. Problem: Discharge Planning:  Goal: Participates in care planning  Description: Participates in care planning  Outcome: Ongoing  Note: Patient participates in care of planning. Problem: Pain:  Goal: Pain level will decrease  Description: Pain level will decrease  Outcome: Ongoing  Note: Patient has denied any pain so far this shift.

## 2020-04-13 NOTE — H&P
HOSPITALIST PROGRESS NOTE      Patient:  Armida Otoole    Unit/Bed:4B-13/013-A  YOB: 1955  MRN: [id number]   PCP: Sarita Lilly DO  Date of Admission: 4/12/2020  Chief Complaint:- shortness of breath    Assessment and Plan:    1. Sepsis:  Noted elevated Lactic acid of 4.3, fluid bolusing of 30ml/kg of 0.9NS given, repeat is pending. Likely a pulmonary source. Vancomycin and Zosyn given while in the ER. MRSA PCR screening is pending. 2. COVID rule out:  Immunocompromised state, positive for fever, cough, myalgias, dyspnea. Disaster panel is pending. 3. Abnormal CT of Chest:  4/12/2020 concerns of pulmonary abscess. Left large thick rim collection with an air fluid level, concerning for a pulmonary abscess. Pulmonary to evaluate. Zosyn will be continued. 4. Metastatic NSCLC:  Mets to the brain, as well as history of stage IV colon cancer. Med-port present right upper chest, established patient of Dr. Merissa Cleaning, will request old records. Family tells me currently receiving radiation therapy. Decadron was continued. 5. Chronic respiratory failure: continuous use of oxygen at 2L/NC, monitor. 6. Seizure: history, Keppra continued. 7. COPD: stable, history: Symbicort continued. 8. PAF:  [id number], Eliquis was continued, monitor rate. Patient is currently in NSR. Toprol XL to continue with parameters to hold. 9. Essential HPTN:  Norvasc, Toprol XL continued with parameters to hold. 10. Dyslipidemia:  history  11. Underweight:  BMI 16.5    INITIAL H AND P AND ICU COURSE:    Armida Otoole is a 77 y.o. female who presented to [facility] ER for evaluation of abnormal chest x-ray findings. Patient has a significant history of non-small cell lung cancer with metastases to the brain as well as a history of stage IV colon cancer. The patient follows with Dr. Merissa Cleaning, Heme/Onc. Family identifies she had received chemo and is currently undergoing radiation.  CVA, Essential HPTN, GERD, COPD, (placeholder)   Other RX Placeholder    vancomycin  750 mg Intravenous Q24H    sodium chloride  30 mL/kg Intravenous Once     Continuous Infusions:    PHYSICAL EXAMINATION:  T:  98.1. P:  70. RR:  18. B/P:  129/60. FiO2:  2L/NC. O2 Sat:  100. I/O:  pending  Body mass index is 16.42 kg/m². GCS:   15  General:   Pale chronically ill in appearance, cachectic  HEENT:  normocephalic and atraumatic. No scleral icterus. PERR  Neck: supple. No Thyromegaly. Lungs: clear to auscultation, diminished in the bases bilaterally. No retractions. MediPort in place in the right upper chest  Cardiac: S1S2. No JVD. Abdomen: soft. Nontender, scaphoid bowel sounds present x4  Extremities:  No clubbing, cyanosis, or edema x 4. Vasculature: capillary refill < 3 seconds. Palpable dorsalis pedis pulses. Skin:  warm and dry. Psych:  Alert and oriented x3. Affect appropriate  Lymph:  No supraclavicular adenopathy. Neurologic:  No focal deficit. No seizures. Data: (All radiographs, tracings, PFTs, and imaging are personally viewed and interpreted unless otherwise noted).  Sodium 136 potassium 3.1 chloride 72 CO2 30, BUN is 46 creatinine is 1.2, magnesium is 1.8 glucose 159 calcium is 8.4 procalcitonin 0.46, CRP 3.28  troponin 0 0.018, albumin 2.6 alk phos 122 ALT is 28 AST is 10 total bilirubin less than 0.2. White count 22.3 hemoglobin 8.6 hematocrit is 26.6, platelet count 417, ferritin 332 INR 0.91   Group A strep throat culture negative   Influenza A and B- negative   Urine negative for blood, negative for protein, negative for nitrates, negative for leukocytes   CT of chest-postsurgical changes in the left hilar region. There is a large thick rim collection with an air-fluid level. This is concerning for a pulmonary abscess. There is a 2 x 1 x 1.6 pulmonary nodule in the right middle lobe abutting the fissure. There is another pulmonary nodule in the right lower lobe which measures 1.3 x 1.1.

## 2020-04-13 NOTE — PLAN OF CARE
Patient had 6 beats of V. tach on the monitor; was asymptomatic; check a stat magnesium and potassium and replace as needed

## 2020-04-13 NOTE — PROGRESS NOTES
Nutrition Assessment    Type and Reason for Visit: Initial, Positive Nutrition Screen(Wound)    Nutrition Recommendations:   *Advance diet as tolerated when medically feasible. *Consider a Multivitamin w/minerals daily. *Started Ensure Clear TID. Will send Ensure Enlive TID when diet is advanced. *Recommend getting an admit weight on patient when able. Nutrition Assessment: Pt. severely malnourished AEB criteria listed below. At risk for further nutrition compromise r/t admit with SOB and is a COVID-19 R/O, pt only on a Clear Liquid diet at present, underlying medical condition (NSCLC with mets to brain undergoing radiation therapy at present, COPD, HTN) and need for nutrition support. Nutrition recommendations/interventions as per above. Malnutrition Assessment:  · Malnutrition Status: Meets the criteria for severe malnutrition  · Context: Acute illness or injury  · Findings of the 6 clinical characteristics of malnutrition (Minimum of 2 out of 6 clinical characteristics is required to make the diagnosis of moderate or severe Protein Calorie Malnutrition based on AND/ASPEN Guidelines):  1. Energy Intake-Less than or equal to 50% of estimated energy requirement, Greater than or equal to 5 days    2. Weight Loss-(-8.3% weight loss), in 1 month  3. Fat Loss-Unable to assess,    4. Muscle Loss-Unable to assess,    5. Fluid Accumulation-No significant fluid accumulation, Extremities  6.   Strength-Not measured    Nutrition Risk Level: High    Nutrient Needs:  · Estimated Daily Total Kcal: 2154-6115 kcal/day (30-35 kcal/kg - 49 kg on 4/12- stated weight)  · Estimated Daily Protein (g): 73+ g/day (1.5+ g/kg - 49 kg on 4/12- stated weight)    Nutrition Diagnosis:   · Problem: Severe malnutrition, In context of acute illness or injury  · Etiology: related to Insufficient energy/nutrient consumption, Catabolic illness(cancer treatment)     Signs and symptoms:  as evidenced by Diet history of poor intake, Weight loss greater than or equal to 5% in 1 month    Objective Information:  · Nutrition-Focused Physical Findings: pt appears malnourished per RN but is tolerating diet well and is encouraged to eat; unable to see pt d/t being a COVID-19 R/O; called patients room x3 and no answer; called ECF but RN was busy and never called back; no BM yet; undergoing radiation d/t non-small cell lung cancer with mets to brain and hx of stage IV colon cancer  · Wound Type: (left skin skin tear; redness on cocycx)  · Current Nutrition Therapies:  · Oral Diet Orders: Clear Liquid   · Oral Diet intake: Unable to assess(nothing per I/O)  · Oral Nutrition Supplement (ONS) Orders: Clear Liquid Oral Supplement(Ensure Clear TID; Will send Strawberry Ensure Enlive TID when diet is advanced)  · ONS intake: (Initiated today)  · Anthropometric Measures:  · Ht: 5' 8\" (172.7 cm)   · Current Body Wt: 108 lb (49 kg)(4/12; stated weight; no edema noted)  · Admission Body Wt: 108 lb (49 kg)(4/12; stated weight; no edema noted)  · Usual Body Wt: 108 lb (49 kg)(stated upon admit. No weight history per EMR. Per RN at [facility]: 109 lb on 3/5/20; 100.2 lb on 4/4/20)  · % Weight Change: -8.3% weight loss in one month  · Ideal Body Wt: 140 lb (63.5 kg)   · BMI Classification: BMI <18.5 Underweight(16.5)    Nutrition Interventions:   Continue current diet, Start ONS, Vitamin Supplement(Consider a Multivitamin w/minerals daily. Started Ensure Clear TID.  Advance diet as tolerated when medically feasible)  Continued Inpatient Monitoring, Education not appropriate at this time, Coordination of Care    Nutrition Evaluation:   · Evaluation: Goals set   · Goals: Pt will recieve adequate nutrition within 1-4 days    · Monitoring: Nutrition Progression, Meal Intake, Supplement Intake, Diet Tolerance, Skin Integrity, Wound Healing, Pertinent Labs, Monitor Bowel Function    Electronically signed by Emanuel Bhatt RD, LD on 4/13/20 at 1:26 PM EDT    Contact Number: [phone]

## 2020-04-13 NOTE — PROGRESS NOTES
 QUEtiapine  25 mg Oral BID    sertraline  50 mg Oral Daily    sodium chloride flush  10 mL Intravenous 2 times per day    piperacillin-tazobactam  3.375 g Intravenous Q8H     PRN Meds: cyclobenzaprine, acetaminophen **OR** acetaminophen, ondansetron, polyethylene glycol, prochlorperazine, sodium chloride flush, promethazine **OR** ondansetron, senna    No intake or output data in the 24 hours ending 04/13/20 0805    Diet:  DIET CLEAR LIQUID;    Exam:  BP (!) 144/63   Pulse 70   Temp 98 °F (36.7 °C) (Oral)   Resp 16   Ht 5' 8\" (1.727 m)   Wt 108 lb (49 kg)   SpO2 100%   BMI 16.42 kg/m²     General:   Pale chronically ill in appearance, cachectic  HEENT:  normocephalic and atraumatic. No scleral icterus. PERR  Neck: supple. No Thyromegaly. Lungs: clear to auscultation, diminished in the bases bilaterally. No retractions. MediPort in place in the right upper chest  Cardiac: S1S2. No JVD. Abdomen: soft. Nontender, scaphoid bowel sounds present x4  Extremities:  No clubbing, cyanosis, or edema x 4. Vasculature: capillary refill < 3 seconds. Palpable dorsalis pedis pulses. Skin:  warm and dry. Psych:  Alert and oriented x3. Affect appropriate  Lymph:  No supraclavicular adenopathy. Neurologic:  No focal deficit. No seizures. Labs:   Recent Labs     04/13/20  0538   WBC 16.4*   HGB 7.5*   HCT 23.4*        Recent Labs     04/13/20  0538      K 3.8      CO2 27   BUN 30*   CREATININE 0.8   CALCIUM 7.7*     Recent Labs     04/13/20  0538   AST 9   ALT 16   BILIDIR <0.2   BILITOT <0.2*   ALKPHOS 106     Recent Labs     04/12/20  1634   INR 0.91     Recent Labs     04/13/20  0538   CKTOTAL 10*       Urinalysis:      Lab Results   Component Value Date    NITRU NEGATIVE 04/12/2020    BLOODU NEGATIVE 04/12/2020    GLUCOSEU NEGATIVE 04/12/2020       Radiology:   All imaging reviewed     Diet: DIET CLEAR LIQUID;      Code Status: Limited            Electronically signed by Bhavani Torres

## 2020-04-14 LAB
ABO: NORMAL
ANION GAP SERPL CALCULATED.3IONS-SCNC: 10 MEQ/L (ref 8–16)
ANISOCYTOSIS: PRESENT
ANTIBODY SCREEN: NORMAL
BASOPHILS # BLD: 0.2 %
BASOPHILS ABSOLUTE: 0 THOU/MM3 (ref 0–0.1)
BUN BLDV-MCNC: 14 MG/DL (ref 7–22)
CALCIUM SERPL-MCNC: 6.1 MG/DL (ref 8.5–10.5)
CHLORIDE BLD-SCNC: 113 MEQ/L (ref 98–111)
CO2: 20 MEQ/L (ref 23–33)
CREAT SERPL-MCNC: 0.4 MG/DL (ref 0.4–1.2)
EMERGENT DISEASE RESULT: NORMAL
EOSINOPHIL # BLD: 0 %
EOSINOPHILS ABSOLUTE: 0 THOU/MM3 (ref 0–0.4)
ERYTHROCYTE [DISTWIDTH] IN BLOOD BY AUTOMATED COUNT: 19.2 % (ref 11.5–14.5)
ERYTHROCYTE [DISTWIDTH] IN BLOOD BY AUTOMATED COUNT: 69.1 FL (ref 35–45)
GFR SERPL CREATININE-BSD FRML MDRD: > 90 ML/MIN/1.73M2
GLUCOSE BLD-MCNC: 76 MG/DL (ref 70–108)
HCT VFR BLD CALC: 20.6 % (ref 37–47)
HEMOGLOBIN: 6.5 GM/DL (ref 12–16)
IMMATURE GRANS (ABS): 1.08 THOU/MM3 (ref 0–0.07)
IMMATURE GRANULOCYTES: 5.9 %
LYMPHOCYTES # BLD: 2.3 %
LYMPHOCYTES ABSOLUTE: 0.4 THOU/MM3 (ref 1–4.8)
MAGNESIUM: 1.5 MG/DL (ref 1.6–2.4)
MCH RBC QN AUTO: 31.3 PG (ref 26–33)
MCHC RBC AUTO-ENTMCNC: 31.6 GM/DL (ref 32.2–35.5)
MCV RBC AUTO: 99 FL (ref 81–99)
MONOCYTES # BLD: 2.1 %
MONOCYTES ABSOLUTE: 0.4 THOU/MM3 (ref 0.4–1.3)
MRSA SCREEN: ABNORMAL
NUCLEATED RED BLOOD CELLS: 0 /100 WBC
ORGANISM: ABNORMAL
PATHOLOGIST REVIEW: ABNORMAL
PLATELET # BLD: 137 THOU/MM3 (ref 130–400)
PMV BLD AUTO: 8.5 FL (ref 9.4–12.4)
POTASSIUM SERPL-SCNC: 2.9 MEQ/L (ref 3.5–5.2)
RBC # BLD: 2.08 MILL/MM3 (ref 4.2–5.4)
RH FACTOR: NORMAL
SARS-COV-2: NOT DETECTED
SCAN OF BLOOD SMEAR: NORMAL
SEG NEUTROPHILS: 89.5 %
SEGMENTED NEUTROPHILS ABSOLUTE COUNT: 16.5 THOU/MM3 (ref 1.8–7.7)
SODIUM BLD-SCNC: 143 MEQ/L (ref 135–145)
THROAT/NOSE CULTURE: NORMAL
TOXIC GRANULATION: PRESENT
WBC # BLD: 18.4 THOU/MM3 (ref 4.8–10.8)

## 2020-04-14 PROCEDURE — 36591 DRAW BLOOD OFF VENOUS DEVICE: CPT

## 2020-04-14 PROCEDURE — 86850 RBC ANTIBODY SCREEN: CPT

## 2020-04-14 PROCEDURE — 6360000002 HC RX W HCPCS: Performed by: NURSE PRACTITIONER

## 2020-04-14 PROCEDURE — 86900 BLOOD TYPING SEROLOGIC ABO: CPT

## 2020-04-14 PROCEDURE — 2580000003 HC RX 258: Performed by: NURSE PRACTITIONER

## 2020-04-14 PROCEDURE — 2709999900 HC NON-CHARGEABLE SUPPLY

## 2020-04-14 PROCEDURE — C9113 INJ PANTOPRAZOLE SODIUM, VIA: HCPCS | Performed by: INTERNAL MEDICINE

## 2020-04-14 PROCEDURE — 83735 ASSAY OF MAGNESIUM: CPT

## 2020-04-14 PROCEDURE — 6370000000 HC RX 637 (ALT 250 FOR IP): Performed by: NURSE PRACTITIONER

## 2020-04-14 PROCEDURE — 86923 COMPATIBILITY TEST ELECTRIC: CPT

## 2020-04-14 PROCEDURE — 2580000003 HC RX 258: Performed by: HOSPITALIST

## 2020-04-14 PROCEDURE — 1200000000 HC SEMI PRIVATE

## 2020-04-14 PROCEDURE — 86901 BLOOD TYPING SEROLOGIC RH(D): CPT

## 2020-04-14 PROCEDURE — 85025 COMPLETE CBC W/AUTO DIFF WBC: CPT

## 2020-04-14 PROCEDURE — 36430 TRANSFUSION BLD/BLD COMPNT: CPT

## 2020-04-14 PROCEDURE — 80048 BASIC METABOLIC PNL TOTAL CA: CPT

## 2020-04-14 PROCEDURE — 6360000002 HC RX W HCPCS: Performed by: INTERNAL MEDICINE

## 2020-04-14 PROCEDURE — P9016 RBC LEUKOCYTES REDUCED: HCPCS

## 2020-04-14 PROCEDURE — 2580000003 HC RX 258: Performed by: PHYSICIAN ASSISTANT

## 2020-04-14 PROCEDURE — 99233 SBSQ HOSP IP/OBS HIGH 50: CPT | Performed by: INTERNAL MEDICINE

## 2020-04-14 RX ORDER — 0.9 % SODIUM CHLORIDE 0.9 %
20 INTRAVENOUS SOLUTION INTRAVENOUS ONCE
Status: COMPLETED | OUTPATIENT
Start: 2020-04-14 | End: 2020-04-14

## 2020-04-14 RX ORDER — PANTOPRAZOLE SODIUM 40 MG/1
40 TABLET, DELAYED RELEASE ORAL
Status: DISCONTINUED | OUTPATIENT
Start: 2020-04-14 | End: 2020-04-14 | Stop reason: ALTCHOICE

## 2020-04-14 RX ORDER — PANTOPRAZOLE SODIUM 40 MG/10ML
40 INJECTION, POWDER, LYOPHILIZED, FOR SOLUTION INTRAVENOUS 2 TIMES DAILY
Status: DISCONTINUED | OUTPATIENT
Start: 2020-04-14 | End: 2020-04-14

## 2020-04-14 RX ADMIN — Medication 10 ML: at 20:57

## 2020-04-14 RX ADMIN — AMLODIPINE BESYLATE 10 MG: 10 TABLET ORAL at 10:17

## 2020-04-14 RX ADMIN — Medication 10 ML: at 10:12

## 2020-04-14 RX ADMIN — SERTRALINE 50 MG: 50 TABLET, FILM COATED ORAL at 10:17

## 2020-04-14 RX ADMIN — QUETIAPINE FUMARATE 25 MG: 25 TABLET ORAL at 10:17

## 2020-04-14 RX ADMIN — QUETIAPINE FUMARATE 50 MG: 50 TABLET, EXTENDED RELEASE ORAL at 23:14

## 2020-04-14 RX ADMIN — LEVETIRACETAM 500 MG: 500 TABLET, FILM COATED ORAL at 23:14

## 2020-04-14 RX ADMIN — SODIUM CHLORIDE: 9 INJECTION, SOLUTION INTRAVENOUS at 13:05

## 2020-04-14 RX ADMIN — POTASSIUM CHLORIDE 10 MEQ: 7.46 INJECTION, SOLUTION INTRAVENOUS at 10:04

## 2020-04-14 RX ADMIN — POTASSIUM CHLORIDE 10 MEQ: 7.46 INJECTION, SOLUTION INTRAVENOUS at 13:05

## 2020-04-14 RX ADMIN — POTASSIUM CHLORIDE 10 MEQ: 7.46 INJECTION, SOLUTION INTRAVENOUS at 10:55

## 2020-04-14 RX ADMIN — PIPERACILLIN AND TAZOBACTAM 3.38 G: 3; .375 INJECTION, POWDER, FOR SOLUTION INTRAVENOUS at 04:23

## 2020-04-14 RX ADMIN — Medication 2 PUFF: at 10:56

## 2020-04-14 RX ADMIN — POTASSIUM CHLORIDE 10 MEQ: 7.46 INJECTION, SOLUTION INTRAVENOUS at 11:59

## 2020-04-14 RX ADMIN — DEXAMETHASONE 4 MG: 4 TABLET ORAL at 10:17

## 2020-04-14 RX ADMIN — Medication 3 MG: at 10:17

## 2020-04-14 RX ADMIN — PANTOPRAZOLE SODIUM 40 MG: 40 INJECTION, POWDER, FOR SOLUTION INTRAVENOUS at 10:12

## 2020-04-14 RX ADMIN — SODIUM CHLORIDE 20 ML: 9 INJECTION, SOLUTION INTRAVENOUS at 10:08

## 2020-04-14 RX ADMIN — DEXAMETHASONE 4 MG: 4 TABLET ORAL at 16:28

## 2020-04-14 RX ADMIN — QUETIAPINE FUMARATE 25 MG: 25 TABLET ORAL at 23:14

## 2020-04-14 RX ADMIN — PIPERACILLIN AND TAZOBACTAM 3.38 G: 3; .375 INJECTION, POWDER, FOR SOLUTION INTRAVENOUS at 13:05

## 2020-04-14 RX ADMIN — METOPROLOL SUCCINATE 50 MG: 50 TABLET, EXTENDED RELEASE ORAL at 10:17

## 2020-04-14 RX ADMIN — LEVETIRACETAM 500 MG: 500 TABLET, FILM COATED ORAL at 10:17

## 2020-04-14 ASSESSMENT — PAIN DESCRIPTION - PROGRESSION

## 2020-04-14 ASSESSMENT — PAIN SCALES - GENERAL
PAINLEVEL_OUTOF10: 0
PAINLEVEL_OUTOF10: 0
PAINLEVEL_OUTOF10: 3
PAINLEVEL_OUTOF10: 0
PAINLEVEL_OUTOF10: 0

## 2020-04-14 ASSESSMENT — PAIN DESCRIPTION - ONSET: ONSET: ON-GOING

## 2020-04-14 ASSESSMENT — PAIN - FUNCTIONAL ASSESSMENT: PAIN_FUNCTIONAL_ASSESSMENT: ACTIVITIES ARE NOT PREVENTED

## 2020-04-14 ASSESSMENT — PAIN DESCRIPTION - DESCRIPTORS: DESCRIPTORS: ACHING

## 2020-04-14 ASSESSMENT — PAIN DESCRIPTION - FREQUENCY: FREQUENCY: CONTINUOUS

## 2020-04-14 ASSESSMENT — PAIN DESCRIPTION - LOCATION: LOCATION: HEAD

## 2020-04-14 ASSESSMENT — PAIN SCALES - WONG BAKER
WONGBAKER_NUMERICALRESPONSE: 0
WONGBAKER_NUMERICALRESPONSE: 0

## 2020-04-14 ASSESSMENT — PAIN DESCRIPTION - PAIN TYPE: TYPE: ACUTE PAIN

## 2020-04-14 NOTE — PROGRESS NOTES
Oral BID    melatonin  3 mg Oral Daily    QUEtiapine  50 mg Oral Nightly    QUEtiapine  25 mg Oral BID    sertraline  50 mg Oral Daily    sodium chloride flush  10 mL Intravenous 2 times per day     PRN Meds: cyclobenzaprine, acetaminophen **OR** acetaminophen, potassium chloride **OR** potassium alternative oral replacement **OR** potassium chloride, ondansetron, polyethylene glycol, prochlorperazine, sodium chloride flush, promethazine **OR** ondansetron, senna      Intake/Output Summary (Last 24 hours) at 4/14/2020 1405  Last data filed at 4/13/2020 2201  Gross per 24 hour   Intake 3907 ml   Output --   Net 3907 ml       Diet:  DIET CLEAR LIQUID;  Dietary Nutrition Supplements: Clear Liquid Oral Supplement    Exam:  /60   Pulse 82   Temp 97.6 °F (36.4 °C) (Oral)   Resp 18   Ht 5' 8\" (1.727 m)   Wt 108 lb (49 kg)   SpO2 99%   BMI 16.42 kg/m²     General:   Pale chronically ill in appearance, cachectic  HEENT:  normocephalic and atraumatic.  No scleral icterus. PERR  Neck: supple.  No Thyromegaly. Lungs: clear to auscultation, diminished in the bases bilaterally.  No retractions.  MediPort in place in the right upper chest  Cardiac: S1S2.  No JVD. Abdomen: soft.  Nontender, scaphoid bowel sounds present x4  Extremities:  No clubbing, cyanosis, or edema x 4.    Vasculature: capillary refill < 3 seconds. Palpable dorsalis pedis pulses. Skin:  warm and dry. Psych:  Alert and oriented x3.  Affect appropriate  Lymph:  No supraclavicular adenopathy. Neurologic:  No focal deficit.  No seizures.         Labs:   Recent Labs     04/14/20  0610   WBC 18.4*   HGB 6.5*   HCT 20.6*        Recent Labs     04/14/20  0610      K 2.9*   *   CO2 20*   BUN 14   CREATININE 0.4   CALCIUM 6.1*     Recent Labs     04/13/20  0538   AST 9   ALT 16   BILIDIR <0.2   BILITOT <0.2*   ALKPHOS 106     Recent Labs     04/13/20  1420   INR 1.03     Recent Labs     04/13/20  0538   CKTOTAL 10* Urinalysis:      Lab Results   Component Value Date    NITRU NEGATIVE 04/12/2020    BLOODU NEGATIVE 04/12/2020    GLUCOSEU NEGATIVE 04/12/2020       Radiology:   All imaging reviewed     Diet: DIET CLEAR LIQUID;  Dietary Nutrition Supplements: Clear Liquid Oral Supplement      Code Status: DNR-CC            Electronically signed by Jos Berry MD on 4/14/2020 at 2:05 PM

## 2020-04-14 NOTE — PLAN OF CARE
light in reach. Patient at risk due to confusion. Problem: Mood - Altered:  Goal: Mood stable  Description: Mood stable  4/14/2020 0228 by Nallely Lebron RN  Outcome: Ongoing  Note: Patient verbally aggressive this shift with staff at times. Care plan reviewed with patient and family. Patient and family verbalize understanding of the plan of care and contribute to goal setting.

## 2020-04-15 VITALS
TEMPERATURE: 97.8 F | OXYGEN SATURATION: 99 % | BODY MASS INDEX: 16.22 KG/M2 | DIASTOLIC BLOOD PRESSURE: 67 MMHG | WEIGHT: 107 LBS | RESPIRATION RATE: 18 BRPM | HEIGHT: 68 IN | SYSTOLIC BLOOD PRESSURE: 142 MMHG | HEART RATE: 90 BPM

## 2020-04-15 PROCEDURE — 6360000002 HC RX W HCPCS: Performed by: NURSE PRACTITIONER

## 2020-04-15 PROCEDURE — 6370000000 HC RX 637 (ALT 250 FOR IP): Performed by: NURSE PRACTITIONER

## 2020-04-15 PROCEDURE — 99238 HOSP IP/OBS DSCHRG MGMT 30/<: CPT | Performed by: INTERNAL MEDICINE

## 2020-04-15 PROCEDURE — 6360000002 HC RX W HCPCS: Performed by: INTERNAL MEDICINE

## 2020-04-15 PROCEDURE — 94760 N-INVAS EAR/PLS OXIMETRY 1: CPT

## 2020-04-15 PROCEDURE — 2700000000 HC OXYGEN THERAPY PER DAY

## 2020-04-15 RX ORDER — PROMETHAZINE HYDROCHLORIDE 12.5 MG/1
12.5 TABLET ORAL EVERY 6 HOURS PRN
DISCHARGE
Start: 2020-04-15 | End: 2020-04-22

## 2020-04-15 RX ORDER — HEPARIN SODIUM (PORCINE) LOCK FLUSH IV SOLN 100 UNIT/ML 100 UNIT/ML
500 SOLUTION INTRAVENOUS ONCE
Status: COMPLETED | OUTPATIENT
Start: 2020-04-15 | End: 2020-04-15

## 2020-04-15 RX ADMIN — DEXAMETHASONE 4 MG: 4 TABLET ORAL at 11:13

## 2020-04-15 RX ADMIN — HEPARIN 500 UNITS: 100 SYRINGE at 16:24

## 2020-04-15 RX ADMIN — Medication 2 PUFF: at 11:18

## 2020-04-15 RX ADMIN — SERTRALINE 50 MG: 50 TABLET, FILM COATED ORAL at 11:13

## 2020-04-15 RX ADMIN — Medication 8.8 MG: at 11:14

## 2020-04-15 RX ADMIN — QUETIAPINE FUMARATE 25 MG: 25 TABLET ORAL at 11:13

## 2020-04-15 RX ADMIN — AMLODIPINE BESYLATE 10 MG: 10 TABLET ORAL at 11:13

## 2020-04-15 RX ADMIN — LEVETIRACETAM 500 MG: 500 TABLET, FILM COATED ORAL at 11:13

## 2020-04-15 ASSESSMENT — PAIN SCALES - GENERAL: PAINLEVEL_OUTOF10: 0

## 2020-04-15 NOTE — CARE COORDINATION
4/15/20, 4:05 PM EDT    DISCHARGE PLANNING EVALUATION    Call to daughter (spoke with Jamil)-advised him that patient would be discharged this [facility]. SW advised him that facility was made aware that [facility] did not want to be involved with medicaid or other issues and suggested that she consider allowing someone else to assume this role. He stated that there was nobody else however, he would let her know.
René updated by Katya Guillermo with palliative care. 2:48 PM  [facility] and paperwork faxed. Pat notified of transport time. Call to daughter Raquel Mcgowan answer and message left to contact RAMON Kowalski with [facility] updated. RN Unique Rico spoke with niece Jurgen Shannon and advised her of discharge and issues with daughter, finances, remaining medicare days, etc. Jurgen Shannon has advised that she will get with patient's siblings to discuss plans for patient. Pat with [facility] updated. Directions left for RN for completion of discharge. No other needs at this time. 4/15/20, 3:27 PM EDT    Patient goals/plan/ treatment preferences discussed by  and . Patient goals/plan/ treatment preferences reviewed with patient/ family. Patient/ family verbalize understanding of discharge plan and are in agreement with goal/plan/treatment preferences. Understanding was demonstrated using the teach back method. AVS provided by RN at time of discharge, which includes all necessary medical information pertaining to the patients current course of illness, treatment, post-discharge goals of care, and treatment preferences.     Services After Discharge  Services At/After Discharge: Nursing Services, Timothy Andrea, In ambulance, Transport(Amanda Cummings)

## 2020-04-15 NOTE — DISCHARGE SUMMARY
the stomach and the spleen. Further evaluation with IV contrast enhanced MRI of the abdomen would be beneficial for further characterization. 5. Innumerable low-density lesions are seen throughout the hepatic parenchyma concerning for diffuse metastasis. 6. No pulmonary embolus. **This report has been created using voice recognition software. It may contain minor errors which are inherent in voice recognition technology. **      Final report electronically signed by Dr Meehan Officer on 4/12/2020 7:21 PM             Consults:     PHARMACY TO DOSE VANCOMYCIN  IP CONSULT TO SOCIAL WORK  PALLIATIVE CARE EVAL    Disposition: SNF  Condition at Discharge: Terminal    Code Status:  DNR-CC     Patient Instructions:    Discharge lab work:    Activity: activity as tolerated  Diet: Dietary Nutrition Supplements: Clear Liquid Oral Supplement  DIET GENERAL;      Follow-up visits:   Leon Awan DO  [address] [facility] [address] [phone]          [place] [address]  [phone]             Discharge Medications:      Miles Patel   Home Medication Instructions ASO:[id number]    Printed on:04/15/20 1344   Medication Information                      acetaminophen (TYLENOL) 325 MG tablet  Take 650 mg by mouth every 6 hours as needed for Pain             amLODIPine (NORVASC) 10 MG tablet  Take 10 mg by mouth daily             budesonide-formoterol (SYMBICORT) 160-4.5 MCG/ACT AERO  Inhale 2 puffs into the lungs 2 times daily             dexamethasone (DECADRON) 4 MG tablet  Take 4 mg by mouth 2 times daily (with meals)             furosemide (LASIX) 40 MG tablet  Take 40 mg by mouth 2 times daily             levETIRAcetam (KEPPRA) 500 MG tablet  Take 500 mg by mouth 2 times daily             melatonin 3 MG TABS tablet  Take 3 mg by mouth daily             metoprolol succinate (TOPROL XL) 50 MG extended release tablet  Take 50 mg by mouth daily

## 2020-04-15 NOTE — PROGRESS NOTES
Report called to [address] [facility] [place] LPN at [facility]. Notified of transport time of 5 pm.  Arben Sosa notified of transport time as well.

## 2020-04-15 NOTE — PLAN OF CARE
Problem: Falls - Risk of:  Goal: Will remain free from falls  Description: Will remain free from falls  4/14/2020 2221 by Thea Morrison RN  Outcome: Ongoing  Note: Assessment & interventions provided throughout shift. Bed locked & in low position, call light in reach, side-rails up x2, non-slip socks on when ambulating, reminded patient to use call light to call for assistance. Patient remained free from falls. Bed alarm on. Walks with gait belt and walker. Problem: Neurological  Goal: Maximum potential motor/sensory/cognitive function  4/14/2020 2221 by Thea Morrison RN  Outcome: Ongoing  Note: Patient only alert to self and birthday. Patient has cancer metastasized to the brain. Patient verbally aggressive and non compliant. Problem: Respiratory  Goal: No pulmonary complications  5/73/4307 2221 by Thea Morrison RN  Outcome: Ongoing  Note: Patient on one liter of oxygen per nasal cannula for comfort for patient. SpO2 greater than 90% on room air. Patients lung sounds clear/diminished to the bases. Problem: Skin Integrity/Risk  Goal: No skin breakdown during hospitalization  4/14/2020 2221 by Thea Morrison RN  Outcome: Ongoing  Note: Ongoing assessment & interventions provided throughout shift. Skin assessments provided. Encouraging/assisting patient to turn as needed. Mepilex applied to skin tear on left shin. Encouraging patient to turn frequently. Patient ambulates to the bathroom and wears a brief for intermittent incontinence. Monitoring for wetness and skin breakdown. Problem: Discharge Planning:  Goal: Discharged to appropriate level of care  Description: Discharged to appropriate level of care  4/14/2020 2221 by Thea Morrison RN  Outcome: Ongoing  Note: Patient transferred to  this evening. Hospice to see in the morning and to assist with placement or home hospice care upon discharge.       Problem: Injury - Risk of, Physical Injury:  Goal: Will remain free from falls  Description: Will remain free from falls  4/14/2020 2221 by Joy Patton RN  Outcome: Ongoing  Note: Assessment & interventions provided throughout shift. Bed locked & in low position, call light in reach, side-rails up x2, non-slip socks on when ambulating, reminded patient to use call light to call for assistance. Patient remained free from falls. Bed alarm on. Walks with gait belt and walker. Care plan reviewed with patient. Patient verbalizes understanding of the care plan and contributed to goal setting.

## 2020-04-15 NOTE — PROGRESS NOTES
Spoke with primary nurse Pauly Worthington about patient, patient up with one assist, no adverse symptoms, and can be verbally impulsive( more than likely due to brain mets). Reports patient doing okay today. Call to patient daughter Sasha Menjivar phone to see if questions/concerns in regards to hospice information that was shared with her day prior. Daughter's fiancee answered phone and told nurse that daughter \"in no shape to talk at this time\". Introduced self and that had spoke to Maureen yesterday, told nurse that was aware. Asked if he knew whether any questions about hospice care and voiced \"no\". Encouraged him or Summer to call with questions/concerns about hospice and if would wish for services once discharged. Told nurse that he \"thought that is what the plan is\". Did tell him that thought Maureen had decided on that but wanted to make sure. Did let him know that should daughter wish to visit that at this time 1 or 2 people would be aloud to visit but no more based off of pandemic. Voiced understanding and told nurse that due to [address] verbalness with Maureen that she would not wish to visit and will not be able to care for patient in the home. Told nurse that she is verbally abusive to daughter which was never her personality. Did discuss process of brain mets in some patient and that patient's often not themselves. Did discuss that hospice will continue to follow as  works on plan of care with family. Ulises Colon said that patient absolutely not able to be cared for by [place] and will need placement in facility. Ulises Colon told that  will be calling to work on plan of discharge. This nurse had told daughter in referral on 04.14.2020 about cost room and board at facility. Call to Kaci Maldonado who handed phone to Bahman Deras who has pt today. Updated her of above and that need pricing for [facility] nurse that has obtained and attempted to call with no answer, and voicemail box full.  Will continue to follow for

## 2020-04-15 NOTE — PLAN OF CARE
Problem: Falls - Risk of:  Goal: Will remain free from falls  Description: Will remain free from falls  Outcome: Ongoing     Problem: Neurological  Goal: Maximum potential motor/sensory/cognitive function  Outcome: Ongoing     Problem: Respiratory  Goal: No pulmonary complications  Outcome: Ongoing     Problem: Nutrition  Goal: Optimal nutrition therapy  Outcome: Ongoing     Problem: Skin Integrity/Risk  Goal: No skin breakdown during hospitalization  Outcome: Ongoing     Problem: Discharge Planning:  Goal: Discharged to appropriate level of care  Description: Discharged to appropriate level of care  Outcome: Ongoing   Plan [facility] 4/15/2020  Problem: Pain:  Goal: Pain level will decrease  Description: Pain level will decrease  Outcome: Ongoing     Problem: Confusion - Acute:  Goal: Absence of continued neurological deterioration signs and symptoms  Description: Absence of continued neurological deterioration signs and symptoms  Outcome: Ongoing  Goal: Mental status will be restored to baseline  Description: Mental status will be restored to baseline  Outcome: Ongoing     Problem: Injury - Risk of, Physical Injury:  Goal: Will remain free from falls  Description: Will remain free from falls  Outcome: Ongoing     Problem: Mood - Altered:  Goal: Mood stable  Description: Mood stable  Outcome: Ongoing  Goal: Absence of abusive behavior  Description: Absence of abusive behavior  Outcome: Ongoing  Goal: Verbalizations of feeling emotionally comfortable while being cared for will increase  Description: Verbalizations of feeling emotionally comfortable while being cared for will increase  Outcome: Ongoing     Problem: Psychomotor Activity - Altered:  Goal: Absence of psychomotor disturbance signs and symptoms  Description: Absence of psychomotor disturbance signs and symptoms  Outcome: Ongoing     Problem: Sensory Perception - Impaired:  Goal: Demonstrations of improved sensory functioning will increase  Description:

## 2020-04-16 LAB — MRSA SCREEN: NORMAL

## 2020-04-16 NOTE — PROGRESS NOTES
currently in a nursing home; she was sent to our ER due to recurring fevers and an outpt cxr suggested a lung abscess; she was then admitted.       She was started on antibiotics and daily phone conversations were had with her daughter who expressed a wish that she have comfort care only and be enrolled in Hospice. Accordingly she is being sent back to the nursing home and they will work with her daughter. Prognosis is limited. Kristie Leblanc \"    Since readmission:  Pt has decided against hospice  Daughter, who is POA, will not return our calls  However pt appears to have capacity to make decisions as the moment  Pt states she may not want to go back to hospital, but remains full code. Has done ok since return  Is off ATB  No fevers  Breathing at baseline  VSS  Appetite comes and goes  Working with therapy  Remains skilled  Received transfusion, it appears prior to d/c (but not f/u labs obtained) and K+ was treated, again, no f/u labs obtained      -LAST VISIT:  Patient admitted to [facility] from 3/25 to 4/1 for GI bleed. D/c summary: \"Hospital Course    - Discharge Diagnoses  (1) GI bleed  Status: Acute  Qualifiers:     GI bleed type/associated pathology: unspecified gastrointestinal hemorrhage type  Qualified Code(s): K92.2 - Gastrointestinal hemorrhage, unspecified  Home Going Treatment Plan:  Status post 2 PRBC transfusion. -H&H is stable. Patient is vehemently refusing EGD/colonoscopy & procedure has been  canceled. -Continue to hold Eliquis and patient is seen by her oncologist outpatient. H&H remained  stable. -Continue PT/OT and pending disposition, follow-up with /case management. (2) Atrial fibrillation  Status: Acute  Qualifiers:     Qualified Code(s): I48.91 - Unspecified atrial fibrillation  Home Going Treatment Plan:  continue to hold Eliquis for now. Continue beta-blocker.       (3) Lung cancer metastatic to brain  Status: Acute  Home Going Treatment Plan:  Updated patient's oncologist  Mike Bowling.  Follow-up oncology outpatient. (4) Malnourished  Status: Acute  Qualifiers:     Malnutrition type: protein-calorie malnutrition   Protein-calorie malnutrition  severity: moderate   Qualified Code(s): E44.0 - Moderate protein-calorie malnutrition  Home Going Treatment Plan:  Nutritional support. [facility] Course  Patient received 2 PRBC transfusion. Eliquis was placed on hold. Patient was evaluated  by GI and recommended endoscopy. Patient vehemently refused endoscopic testing. H&H  improved & remained stable. Eliquis will continue to be placed for on hold due to  bleeding risk until patient is seen by hematology/oncologist to decide when to resume. Patient remained stable and will be discharged to SNF. Lorrainelazara Guillermo \"    Since readmission:  Doing well  No s/s GI bleeding  Denies abd pain  No melena or hematochezia  Labs stable today. Was placed on seroquel during admission, it's not immediately clear why this was done  Pt denies any agitation  Does have some anxiety  No sI/hI    UPDATE TODAY:   No recurrence of UGI bld  No abd pain  No melena or hematochezia.    seroquel was resumed again in hospital. Again, not clear why  Is tolerating  Is comfortable  Anxiety stable  On zoloft as well  No SI/HI      -PRIOR VISIT:  Patient admitted to [facility] from 3/6 to 3/13 for AMS. Known stage IV NSCLC with brain metastases. D/c summary:  [facility] Course  78 yo Patient with pmh non small cell lung CA, COPD currently smoking 2 packs a day, afib  on Eliquis presents from home for AMS. Apparently a neighbor found the patient staring off  into space, wouldn't talk. The nephew that takes her to [facility] apparently claimed she was  altered Wednesday. Patient lives alone. Patient is undergoing chemo and radiation. Patient  became agitated and combative in ED, was given Ativan, Haldol, Benadryl and is now  sleeping soundly. I am unable to get any history from the patient. Most information was  obtained from daughter at bedside. Patient had CT head that shows 3 large areas of  probable mets with vasogenic edema, CXR shows increase in size of large cavitary mass in  DANNY. Patient was given IV steroids, being admitted. Patient needs radiation. Has poor  prognosis. Daughter claims she doesn't know patient's wishes and does not have POA. She is  wanting to meet with . Dr Jake Lu requests Dexamethasone and will talk to  radiation oncologist.  Patient admitted for altered mental status secondary to non-small carcinoma of lung  metastasis to brain. As per neurology consultation, no seizure with negative EEG. As per oncology consultation, patient continue Decadron, Keppra  Radiation oncology on board. History of paroxysmal atrial fibrillation, currently normal sinus rhythm on EKG, on  anticoagulation  As per Dr. Jake Lu, the oncologist, patient will continue radiation therapy in outpatient  setting. At the time when patient is at discharge, patient stable, with stable vital  signs, afebrile. Has no new complaints. Patient is advised to follow-up with her care team as the scheduled upon discharge. \"    Since admission:  Done ok  Pt not happy being here and wants to go home  However, not safe per therapy and family does not want her to go home  Still off balance and trouble with transfers    Doing well with chemo/radiation  Denies cough, wheezing, SOB, HA's or seizures  No bleeding, melena or hematochezia. BP's since admission have been <140/90  No stroke like sxs  Is working with therapy    UPDATE LAST VISIT:   Pt acclimating better this time around  Working with therapy  Balance slowly improving    Doing well with chemo/radiation. Staff seeing what f/u plan is  Denies cough, wheezing, SOB, HA's or seizures  BP's since admission have been <140/90  No stroke like sxs  Is working with therapy daily     UPDATE TODAY:   Pt working with therapy on and off  Balance poor yet  Done with chemo/radiation  Not sure what onc plan is.      Pt had many Cranial nerve VIII: Hearing: intact   Cranial nerve IX: Palate Elevation intact bilaterally  Cranial nerve XI: Shoulder shrug intact bilaterally  Cranial nerve XII: Tongue movement: normal     Cerebellar Testing    Finger to Nose:  Right  WNL Yes;  Left WNL  Yes  Heel to Hyman WNL: Right  WNL  Yes;  Left WNL  Yes      Shuffling gait No  Narrow base of support No  Able to stand without using arms  Yes  Bradykinesia  No  Tremor No  Psych: Affect appropriate. Mood euthymic. Thought process is normal without evidence of depression or psychosis. Good insight and appropriate interaction. Cognition and memory appear to be intact. Skin: warm and dry, no rash or erythema to visible areas. Recent Labs     04/14/20  0610 04/13/20  0538 04/12/20  1634   WBC 18.4* 16.4* 22.3*   HGB 6.5* 7.5* 8.6*   HCT 20.6* 23.4* 26.6*   MCV 99.0 96.3 94.7    145 182       Lab Results   Component Value Date     04/14/2020    K 2.9 04/14/2020     04/14/2020    CO2 20 04/14/2020    BUN 14 04/14/2020    CREATININE 0.4 04/14/2020    GLUCOSE 76 04/14/2020    CALCIUM 6.1 04/14/2020        Narrative   PROCEDURE: CTA CHEST W WO CONTRAST       CLINICAL INFORMATION: 38-year-old female with a history of cancer.  Mass seen on previous radiograph.       COMPARISON: No prior study.       TECHNIQUE: 3 mm axial images were obtained through the chest after the administration of IV contrast.  A non-contrast localizer was obtained.  3D reconstructions were performed on the scanner to include MIP images through the right and left pulmonary    arteries and sagittal images through the chest. Isovue was the intravenous contrast utilized.       All CT scans at this facility use dose modulation, iterative reconstruction, and/or weight-based dosing when appropriate to reduce radiation dose to as low as reasonably achievable.       FINDINGS:   There is a 2.5 x 2.2 cm nodule in the right lobe of the thyroid gland with peripheral calcification.

## 2020-04-17 ENCOUNTER — VIRTUAL VISIT (OUTPATIENT)
Dept: FAMILY MEDICINE CLINIC | Age: 65
End: 2020-04-17
Payer: COMMERCIAL

## 2020-04-17 VITALS
WEIGHT: 98 LBS | DIASTOLIC BLOOD PRESSURE: 60 MMHG | BODY MASS INDEX: 16.73 KG/M2 | RESPIRATION RATE: 20 BRPM | HEART RATE: 100 BPM | SYSTOLIC BLOOD PRESSURE: 126 MMHG | HEIGHT: 64 IN | TEMPERATURE: 98 F

## 2020-04-17 PROCEDURE — 1123F ACP DISCUSS/DSCN MKR DOCD: CPT | Performed by: FAMILY MEDICINE

## 2020-04-17 PROCEDURE — 99310 SBSQ NF CARE HIGH MDM 45: CPT | Performed by: FAMILY MEDICINE

## 2020-04-17 RX ORDER — LORAZEPAM 2 MG/ML
1 CONCENTRATE ORAL EVERY 4 HOURS PRN
Qty: 60 ML | Refills: 0 | Status: SHIPPED | OUTPATIENT
Start: 2020-04-17 | End: 2020-05-17

## 2020-04-17 RX ORDER — MORPHINE SULFATE 100 MG/5ML
5 SOLUTION ORAL EVERY 4 HOURS PRN
Qty: 90 ML | Refills: 0 | Status: SHIPPED | OUTPATIENT
Start: 2020-04-17 | End: 2020-05-17

## 2020-04-18 LAB
BLOOD CULTURE, ROUTINE: NORMAL
BLOOD CULTURE, ROUTINE: NORMAL